# Patient Record
Sex: FEMALE | Race: BLACK OR AFRICAN AMERICAN | Employment: OTHER | URBAN - METROPOLITAN AREA
[De-identification: names, ages, dates, MRNs, and addresses within clinical notes are randomized per-mention and may not be internally consistent; named-entity substitution may affect disease eponyms.]

---

## 2017-01-25 ENCOUNTER — TELEPHONE (OUTPATIENT)
Dept: NEUROLOGY | Facility: CLINIC | Age: 57
End: 2017-01-25

## 2017-01-25 ENCOUNTER — TELEPHONE (OUTPATIENT)
Dept: FAMILY MEDICINE CLINIC | Facility: CLINIC | Age: 57
End: 2017-01-25

## 2017-01-25 DIAGNOSIS — M25.559 ARTHRALGIA OF HIP, UNSPECIFIED LATERALITY: Primary | ICD-10-CM

## 2017-01-25 NOTE — TELEPHONE ENCOUNTER
Pt is calling state that she had a referral to see Dr Lorrie Pascal and her referral had   Pt want to know if she can have a extension on her referral pt state that she have a appt for tomorrow   Pt is requesting a call back

## 2017-01-26 ENCOUNTER — OFFICE VISIT (OUTPATIENT)
Dept: NEUROLOGY | Facility: CLINIC | Age: 57
End: 2017-01-26

## 2017-01-26 ENCOUNTER — HOSPITAL ENCOUNTER (OUTPATIENT)
Dept: GENERAL RADIOLOGY | Facility: HOSPITAL | Age: 57
Discharge: HOME | End: 2017-01-26
Attending: PHYSICAL MEDICINE & REHABILITATION
Payer: COMMERCIAL

## 2017-01-26 ENCOUNTER — TELEPHONE (OUTPATIENT)
Dept: NEUROLOGY | Facility: CLINIC | Age: 57
End: 2017-01-26

## 2017-01-26 VITALS
DIASTOLIC BLOOD PRESSURE: 72 MMHG | SYSTOLIC BLOOD PRESSURE: 128 MMHG | HEIGHT: 66 IN | HEART RATE: 86 BPM | BODY MASS INDEX: 27.32 KG/M2 | WEIGHT: 170 LBS | RESPIRATION RATE: 16 BRPM | OXYGEN SATURATION: 98 %

## 2017-01-26 DIAGNOSIS — M51.9 LUMBAR DISC DISEASE: ICD-10-CM

## 2017-01-26 DIAGNOSIS — M54.16 LUMBAR RADICULOPATHY: ICD-10-CM

## 2017-01-26 DIAGNOSIS — M43.16 SPONDYLOLISTHESIS OF LUMBAR REGION: ICD-10-CM

## 2017-01-26 DIAGNOSIS — M54.42 CHRONIC MIDLINE LOW BACK PAIN WITH LEFT-SIDED SCIATICA: Primary | ICD-10-CM

## 2017-01-26 DIAGNOSIS — M25.552 LEFT HIP PAIN: ICD-10-CM

## 2017-01-26 DIAGNOSIS — G89.29 CHRONIC MIDLINE LOW BACK PAIN WITH LEFT-SIDED SCIATICA: ICD-10-CM

## 2017-01-26 DIAGNOSIS — M54.42 CHRONIC MIDLINE LOW BACK PAIN WITH LEFT-SIDED SCIATICA: ICD-10-CM

## 2017-01-26 DIAGNOSIS — G89.29 CHRONIC MIDLINE LOW BACK PAIN WITH LEFT-SIDED SCIATICA: Primary | ICD-10-CM

## 2017-01-26 PROCEDURE — 72120 X-RAY BEND ONLY L-S SPINE: CPT

## 2017-01-26 PROCEDURE — 99244 OFF/OP CNSLTJ NEW/EST MOD 40: CPT | Performed by: PHYSICAL MEDICINE & REHABILITATION

## 2017-01-26 RX ORDER — IBUPROFEN 600 MG/1
600 TABLET ORAL EVERY 8 HOURS PRN
Qty: 90 TABLET | Refills: 3 | Status: SHIPPED | OUTPATIENT
Start: 2017-01-26 | End: 2017-09-07

## 2017-01-26 NOTE — PROGRESS NOTES
Low Back Pain H & P    Chief Complaint:  Patient presents with:  Pain: NP referred by Dr Bin Jaramillo. Pt has long history of pain in left hip, now having pain in right hip also. Pain start in left hip goes to outer aspect of leg down to foot.  Pt present has don History   Past Medical History   Diagnosis Date   • Shingles 2014   • Ectopic pregnancy    • Lipid screening 8/1/2013     per NG       Past Surgical History   No past surgical history on file.     Family History   Family History   Problem Relation Age of On mood.      Respiratory:  No acute respiratory distress. Patient does not have a cough. HEENT:  Extraocular muscles are intact. There is no kern icterus. Pupils are equal, round, and reactive to light. No redness or discharge bilaterally.     Skin:  There pain   RIGHT hip MERISSA test Negative for pain   LEFT hip MERISSA test Negative for pain   RIGHT hip internal rotation Negative for pain   LEFT hip internal rotation Negative for pain   RIGHT hip piriformis stretch test Negative for pain   LEFT hip piriformis

## 2017-01-26 NOTE — PATIENT INSTRUCTIONS
Refill policies:    • Allow 2 business days for refills; controlled substances may take longer.   • Contact your pharmacy at least 5 days prior to running out of medication and have them send an electronic request or submit request through the “request re your physician has recommended that you have a procedure or additional testing performed. [facility] will contact your insurance carrier to obtain pre-certification or prior authorization.     Unfortunately, KATIE has seen an increas

## 2017-01-27 ENCOUNTER — MED REC SCAN ONLY (OUTPATIENT)
Dept: NEUROLOGY | Facility: CLINIC | Age: 57
End: 2017-01-27

## 2017-02-02 PROBLEM — I83.893 VARICOSE VEINS OF BOTH LOWER EXTREMITIES WITH COMPLICATIONS: Status: ACTIVE | Noted: 2017-02-02

## 2017-02-11 ENCOUNTER — TELEPHONE (OUTPATIENT)
Dept: INTERNAL MEDICINE CLINIC | Facility: CLINIC | Age: 57
End: 2017-02-11

## 2017-02-11 RX ORDER — TOBRAMYCIN 3 MG/ML
2 SOLUTION/ DROPS OPHTHALMIC EVERY 6 HOURS
Qty: 5 ML | Refills: 0 | Status: SHIPPED | OUTPATIENT
Start: 2017-02-11 | End: 2017-09-20

## 2017-02-11 NOTE — TELEPHONE ENCOUNTER
Patient, will treat with tobramycin ophthalmic drops, for 5 days, requested that she reports if she has not improved no improvement in symptoms, may need to see ophthalmologist

## 2017-02-11 NOTE — TELEPHONE ENCOUNTER
Reason for Call/Chief Complaint: Eye redness and itching  Onset: 2 days  Nursing Assessment/Associated Symptoms: Pt states that for 2 days she has had redness to her left eye, began itching yesterday and is now affecting the right eye.  Denies drainage, den

## 2017-02-23 ENCOUNTER — TELEPHONE (OUTPATIENT)
Dept: NEUROLOGY | Facility: CLINIC | Age: 57
End: 2017-02-23

## 2017-02-23 NOTE — TELEPHONE ENCOUNTER
----- Message from Mohinder Arana MD sent at 2/22/2017 10:36 PM CST -----  Her spine is stable at L3-4.   Please see how she is doing with the PT.

## 2017-02-23 NOTE — TELEPHONE ENCOUNTER
Patient informed of the below Lumbar Spine Flex/Ext Xrays results per Dr. Patrick Calhoun. Patient has not started Physical Therapy because she has to work it out with her employer also she just had 2 deaths in the family. Condolences offered to patient.   Patient sa

## 2017-03-01 RX ORDER — ERGOCALCIFEROL 1.25 MG/1
CAPSULE ORAL
Qty: 12 CAPSULE | Refills: 0 | Status: SHIPPED | OUTPATIENT
Start: 2017-03-01 | End: 2018-01-23

## 2017-03-07 ENCOUNTER — TELEPHONE (OUTPATIENT)
Dept: INTERNAL MEDICINE CLINIC | Facility: CLINIC | Age: 57
End: 2017-03-07

## 2017-03-07 ENCOUNTER — OFFICE VISIT (OUTPATIENT)
Dept: INTERNAL MEDICINE CLINIC | Facility: CLINIC | Age: 57
End: 2017-03-07

## 2017-03-07 VITALS
BODY MASS INDEX: 29 KG/M2 | SYSTOLIC BLOOD PRESSURE: 125 MMHG | RESPIRATION RATE: 22 BRPM | HEART RATE: 97 BPM | DIASTOLIC BLOOD PRESSURE: 73 MMHG | WEIGHT: 181 LBS | TEMPERATURE: 98 F

## 2017-03-07 DIAGNOSIS — J02.9 PHARYNGITIS, UNSPECIFIED ETIOLOGY: Primary | ICD-10-CM

## 2017-03-07 LAB
CONTROL LINE PRESENT WITH A CLEAR BACKGROUND (YES/NO): YES YES/NO
KIT LOT #: NORMAL NUMERIC
STREP GRP A CUL-SCR: NEGATIVE

## 2017-03-07 PROCEDURE — 87880 STREP A ASSAY W/OPTIC: CPT | Performed by: INTERNAL MEDICINE

## 2017-03-07 PROCEDURE — 99213 OFFICE O/P EST LOW 20 MIN: CPT | Performed by: INTERNAL MEDICINE

## 2017-03-07 RX ORDER — AZITHROMYCIN 250 MG/1
TABLET, FILM COATED ORAL
Qty: 6 TABLET | Refills: 0 | Status: SHIPPED | OUTPATIENT
Start: 2017-03-07 | End: 2017-09-20

## 2017-03-08 NOTE — TELEPHONE ENCOUNTER
Physical therapy of requested by Dr. Asher Ramos in January was approved, but I do not see how many visits was approved, and to make appointment, please send a new referral if needed.

## 2017-03-08 NOTE — TELEPHONE ENCOUNTER
Dr Peg Pink requested referral. Approved for 8 visits at [address] [place] [address].  Patient would need to contact Dr Peg Pink office for a copy of referral.

## 2017-03-08 NOTE — PROGRESS NOTES
HPI:    Patient ID: Antione Wiley is a 64year old female presents for evaluation of the sore throat. HPI  Patient reports very sore throat for last 3 days, she lost voice and now it is coming back.   Sore throat extremely severe night, keeps her awake, and time. She appears well-developed and well-nourished. No distress. HENT:   Head: Normocephalic and atraumatic. Right Ear: Tympanic membrane is not erythematous. No cerumen present  Left Ear: Tympanic membrane is not erythematous.  No cerumen present

## 2017-05-18 ENCOUNTER — TELEPHONE (OUTPATIENT)
Dept: INTERNAL MEDICINE CLINIC | Facility: CLINIC | Age: 57
End: 2017-05-18

## 2017-05-25 ENCOUNTER — APPOINTMENT (OUTPATIENT)
Dept: LAB | Age: 57
End: 2017-05-25
Attending: INTERNAL MEDICINE
Payer: COMMERCIAL

## 2017-05-25 DIAGNOSIS — Z12.11 COLON CANCER SCREENING: ICD-10-CM

## 2017-05-30 ENCOUNTER — TELEPHONE (OUTPATIENT)
Dept: INTERNAL MEDICINE CLINIC | Facility: CLINIC | Age: 57
End: 2017-05-30

## 2017-05-30 DIAGNOSIS — E55.9 VITAMIN D DEFICIENCY: Primary | ICD-10-CM

## 2017-05-30 RX ORDER — ERGOCALCIFEROL 1.25 MG/1
CAPSULE ORAL
Qty: 12 CAPSULE | Refills: 0 | OUTPATIENT
Start: 2017-05-30

## 2017-05-30 NOTE — TELEPHONE ENCOUNTER
Pt  Should   Do lab Vit  D level , BMP  BEFORE   PRESCRIPT[ION VIT d  WILL BE PRESCRIBED  AGAIN , SHE SHOULD CONTINUE   OTC  VIT D3  2000 UNITS DAILY  ON MEANWHILE INDEFINITELY,i placed order for labs

## 2017-05-31 PROCEDURE — 82270 OCCULT BLOOD FECES: CPT

## 2017-06-05 ENCOUNTER — TELEPHONE (OUTPATIENT)
Dept: INTERNAL MEDICINE CLINIC | Facility: CLINIC | Age: 57
End: 2017-06-05

## 2017-06-05 NOTE — TELEPHONE ENCOUNTER
Patient reports pains in the breast that come and go since ultrasound 6 months ago I advised her to watch and document if physical overexertion has something to do with the pain she is having, over wise as scheduled, and follow-up for reexamination of the

## 2017-09-07 RX ORDER — IBUPROFEN 600 MG/1
TABLET ORAL
Qty: 90 TABLET | Refills: 0 | Status: SHIPPED | OUTPATIENT
Start: 2017-09-07 | End: 2018-07-25

## 2017-09-07 NOTE — TELEPHONE ENCOUNTER
Refill request for ibuprofen 600 mg, take 1 tab every 8 hrs as needed, #90, no refills    LOV: 1/26/17  NOV: none

## 2017-09-20 ENCOUNTER — OFFICE VISIT (OUTPATIENT)
Dept: INTERNAL MEDICINE CLINIC | Facility: CLINIC | Age: 57
End: 2017-09-20

## 2017-09-20 ENCOUNTER — APPOINTMENT (OUTPATIENT)
Dept: LAB | Facility: HOSPITAL | Age: 57
End: 2017-09-20
Attending: INTERNAL MEDICINE
Payer: COMMERCIAL

## 2017-09-20 ENCOUNTER — TELEPHONE (OUTPATIENT)
Dept: OTHER | Age: 57
End: 2017-09-20

## 2017-09-20 VITALS
SYSTOLIC BLOOD PRESSURE: 117 MMHG | HEART RATE: 84 BPM | WEIGHT: 188 LBS | BODY MASS INDEX: 30 KG/M2 | DIASTOLIC BLOOD PRESSURE: 73 MMHG

## 2017-09-20 DIAGNOSIS — M54.16 LUMBAR RADICULOPATHY: Primary | ICD-10-CM

## 2017-09-20 DIAGNOSIS — E55.9 VITAMIN D DEFICIENCY: ICD-10-CM

## 2017-09-20 DIAGNOSIS — M25.562 CHRONIC PAIN OF LEFT KNEE: ICD-10-CM

## 2017-09-20 DIAGNOSIS — G89.29 CHRONIC PAIN OF LEFT KNEE: ICD-10-CM

## 2017-09-20 LAB
ALBUMIN SERPL BCP-MCNC: 4 G/DL (ref 3.5–4.8)
ALBUMIN/GLOB SERPL: 1.3 {RATIO} (ref 1–2)
ALP SERPL-CCNC: 88 U/L (ref 32–100)
ALT SERPL-CCNC: 19 U/L (ref 14–54)
ANION GAP SERPL CALC-SCNC: 6 MMOL/L (ref 0–18)
AST SERPL-CCNC: 27 U/L (ref 15–41)
BILIRUB SERPL-MCNC: 0.8 MG/DL (ref 0.3–1.2)
BUN SERPL-MCNC: 8 MG/DL (ref 8–20)
BUN/CREAT SERPL: 11.9 (ref 10–20)
CALCIUM SERPL-MCNC: 9.6 MG/DL (ref 8.5–10.5)
CHLORIDE SERPL-SCNC: 107 MMOL/L (ref 95–110)
CO2 SERPL-SCNC: 27 MMOL/L (ref 22–32)
CREAT SERPL-MCNC: 0.67 MG/DL (ref 0.5–1.5)
GLOBULIN PLAS-MCNC: 3.1 G/DL (ref 2.5–3.7)
GLUCOSE SERPL-MCNC: 86 MG/DL (ref 70–99)
OSMOLALITY UR CALC.SUM OF ELEC: 288 MOSM/KG (ref 275–295)
POTASSIUM SERPL-SCNC: 3.9 MMOL/L (ref 3.3–5.1)
PROT SERPL-MCNC: 7.1 G/DL (ref 5.9–8.4)
SODIUM SERPL-SCNC: 140 MMOL/L (ref 136–144)

## 2017-09-20 PROCEDURE — 82306 VITAMIN D 25 HYDROXY: CPT

## 2017-09-20 PROCEDURE — 36415 COLL VENOUS BLD VENIPUNCTURE: CPT

## 2017-09-20 PROCEDURE — 99212 OFFICE O/P EST SF 10 MIN: CPT | Performed by: INTERNAL MEDICINE

## 2017-09-20 PROCEDURE — 99214 OFFICE O/P EST MOD 30 MIN: CPT | Performed by: INTERNAL MEDICINE

## 2017-09-20 PROCEDURE — 80053 COMPREHEN METABOLIC PANEL: CPT

## 2017-09-20 RX ORDER — CYCLOBENZAPRINE HCL 10 MG
10 TABLET ORAL NIGHTLY
Qty: 30 TABLET | Refills: 0 | Status: SHIPPED | OUTPATIENT
Start: 2017-09-20 | End: 2018-01-23

## 2017-09-20 RX ORDER — ALPRAZOLAM 0.5 MG/1
TABLET, ORALLY DISINTEGRATING ORAL
Qty: 5 TABLET | Refills: 0 | Status: SHIPPED | OUTPATIENT
Start: 2017-09-20 | End: 2017-12-06

## 2017-09-20 RX ORDER — ALPRAZOLAM 0.5 MG/1
TABLET ORAL
Qty: 5 TABLET | Refills: 0 | Status: SHIPPED | OUTPATIENT
Start: 2017-09-20 | End: 2018-01-23

## 2017-09-20 NOTE — TELEPHONE ENCOUNTER
Asking if can get labs ordered for Vitamin D to have drawn before appt as does not want to fast until her appt this afternoon. [facility] had asked Dr Avila Kirk for vitamin D lab order a while ago. Informed pt there is a Vit D lab order from May that [facility] had ordered.

## 2017-09-22 LAB — 25(OH)D3 SERPL-MCNC: 27.9 NG/ML

## 2017-09-22 NOTE — PROGRESS NOTES
HPI:    Patient ID: Herrera Saunders is a 62year old female. Presents for evaluation of the low back pain, left leg pain.   Patient    HPI  Patient reports that she is bothered again by severe pain in the left thigh going down to the left knee, especially c WEEK Disp: 12 capsule Rfl: 0     Allergies:No Known Allergies   /73 (BP Location: Right arm, Patient Position: Sitting, Cuff Size: adult)   Pulse 84   Wt 188 lb (85.3 kg)   BMI 30.34 kg/m²    Physical Exam    Constitutional: She is oriented to person Referrals:  MRI SPINE LUMBAR (CPT=72148)  XR KNEE ROUTINE (3 VIEWS), LEFT (CPT=73562)         VD#[id number]

## 2017-09-25 ENCOUNTER — TELEPHONE (OUTPATIENT)
Dept: OTHER | Age: 57
End: 2017-09-25

## 2017-09-25 NOTE — TELEPHONE ENCOUNTER
Per patient MARAL okay to leave detailed message on VM including blood test results. Detailed VM left on verified VM (Patient stated her name). To call back for any questions or concerns.      ----- Message from Nasima Ackerman MD sent at 9/22/2017  9:58 PM CDT

## 2017-10-11 ENCOUNTER — HOSPITAL ENCOUNTER (OUTPATIENT)
Dept: MRI IMAGING | Facility: HOSPITAL | Age: 57
Discharge: HOME | End: 2017-10-11
Attending: INTERNAL MEDICINE
Payer: COMMERCIAL

## 2017-10-11 DIAGNOSIS — M54.16 LUMBAR RADICULOPATHY: ICD-10-CM

## 2017-10-11 PROCEDURE — 72148 MRI LUMBAR SPINE W/O DYE: CPT | Performed by: INTERNAL MEDICINE

## 2017-10-16 ENCOUNTER — TELEPHONE (OUTPATIENT)
Dept: INTERNAL MEDICINE CLINIC | Facility: CLINIC | Age: 57
End: 2017-10-16

## 2017-10-16 DIAGNOSIS — M79.605 PAIN OF LEFT LOWER EXTREMITY: ICD-10-CM

## 2017-10-16 DIAGNOSIS — M54.16 RADICULOPATHY OF LUMBAR REGION: Primary | ICD-10-CM

## 2017-10-16 DIAGNOSIS — K76.89 LIVER CYST: ICD-10-CM

## 2017-10-26 ENCOUNTER — HOSPITAL ENCOUNTER (OUTPATIENT)
Dept: ULTRASOUND IMAGING | Facility: HOSPITAL | Age: 57
Discharge: HOME | End: 2017-10-26
Attending: INTERNAL MEDICINE
Payer: COMMERCIAL

## 2017-10-26 DIAGNOSIS — M79.605 PAIN OF LEFT LOWER EXTREMITY: ICD-10-CM

## 2017-10-26 DIAGNOSIS — K76.89 LIVER CYST: ICD-10-CM

## 2017-10-26 PROCEDURE — 93971 EXTREMITY STUDY: CPT | Performed by: INTERNAL MEDICINE

## 2017-10-26 PROCEDURE — 76700 US EXAM ABDOM COMPLETE: CPT | Performed by: INTERNAL MEDICINE

## 2017-10-30 ENCOUNTER — TELEPHONE (OUTPATIENT)
Dept: INTERNAL MEDICINE CLINIC | Facility: CLINIC | Age: 57
End: 2017-10-30

## 2017-10-30 DIAGNOSIS — L30.9 DERMATITIS: Primary | ICD-10-CM

## 2017-10-30 DIAGNOSIS — L81.9 HYPERPIGMENTATION: ICD-10-CM

## 2017-11-07 ENCOUNTER — NURSE TRIAGE (OUTPATIENT)
Dept: OTHER | Age: 57
End: 2017-11-07

## 2017-11-07 ENCOUNTER — TELEPHONE (OUTPATIENT)
Dept: INTERNAL MEDICINE CLINIC | Facility: CLINIC | Age: 57
End: 2017-11-07

## 2017-11-07 ENCOUNTER — OFFICE VISIT (OUTPATIENT)
Dept: OPHTHALMOLOGY | Facility: CLINIC | Age: 57
End: 2017-11-07

## 2017-11-07 DIAGNOSIS — R92.30 DENSE BREAST TISSUE ON MAMMOGRAM: Primary | ICD-10-CM

## 2017-11-07 DIAGNOSIS — S05.91XS RIGHT EYE INJURY, SEQUELA: Primary | ICD-10-CM

## 2017-11-07 DIAGNOSIS — H20.00 ACUTE IRITIS, RIGHT EYE: Primary | ICD-10-CM

## 2017-11-07 PROBLEM — S05.01XA RIGHT CORNEAL ABRASION: Status: ACTIVE | Noted: 2017-11-07

## 2017-11-07 PROCEDURE — 99242 OFF/OP CONSLTJ NEW/EST SF 20: CPT | Performed by: OPHTHALMOLOGY

## 2017-11-07 PROCEDURE — 99212 OFFICE O/P EST SF 10 MIN: CPT | Performed by: OPHTHALMOLOGY

## 2017-11-07 RX ORDER — KETOROLAC TROMETHAMINE 5 MG/ML
1 SOLUTION OPHTHALMIC 4 TIMES DAILY
Qty: 1 BOTTLE | Refills: 0 | Status: SHIPPED | OUTPATIENT
Start: 2017-11-07 | End: 2017-11-14
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
## 2017-11-07 NOTE — TELEPHONE ENCOUNTER
appt made with Dr. Jesi Mariano for 10:15. Pt agrees and is able to come at that time. Referral generated.

## 2017-11-07 NOTE — TELEPHONE ENCOUNTER
Pt sent [facility] [employer] request for a mammogram order to be placed on her chart. Please contact Pt once order has been confirmed.

## 2017-11-07 NOTE — TELEPHONE ENCOUNTER
Spoke to pt  adivsied that we will try to get her to see ophtalmologist for evaluation  And will call her back, please  Facilitate  appt  For pt today

## 2017-11-07 NOTE — PATIENT INSTRUCTIONS
Acute iritis, right eye  Discussed diagnosis and treatment in detail with patient. Reassured that there is no foreign body or scratch at this time. Start Acular drops 4 times a day in the right eye for 1 week.    Patient should call office and make retu

## 2017-11-07 NOTE — ASSESSMENT & PLAN NOTE
Discussed diagnosis and treatment in detail with patient. Reassured that there is no foreign body or scratch at this time. Start Acular drops 4 times a day in the right eye for 1 week.    Patient should call office and make return appointment if symptom
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
## 2017-11-15 ENCOUNTER — HOSPITAL ENCOUNTER (OUTPATIENT)
Dept: MRI IMAGING | Facility: HOSPITAL | Age: 57
Discharge: HOME | End: 2017-11-15
Attending: INTERNAL MEDICINE
Payer: COMMERCIAL

## 2017-11-15 DIAGNOSIS — K76.9 LIVER LESION: ICD-10-CM

## 2017-11-15 PROCEDURE — 82565 ASSAY OF CREATININE: CPT

## 2017-11-15 PROCEDURE — A9575 INJ GADOTERATE MEGLUMI 0.1ML: HCPCS

## 2017-11-15 PROCEDURE — 74183 MRI ABD W/O CNTR FLWD CNTR: CPT | Performed by: INTERNAL MEDICINE

## 2017-11-28 ENCOUNTER — HOSPITAL ENCOUNTER (OUTPATIENT)
Dept: MAMMOGRAPHY | Facility: HOSPITAL | Age: 57
Discharge: HOME | End: 2017-11-28
Attending: INTERNAL MEDICINE
Payer: COMMERCIAL

## 2017-11-28 DIAGNOSIS — R92.2 DENSE BREAST TISSUE ON MAMMOGRAM: ICD-10-CM

## 2017-11-28 PROCEDURE — 77066 DX MAMMO INCL CAD BI: CPT | Performed by: INTERNAL MEDICINE

## 2017-12-06 ENCOUNTER — OFFICE VISIT (OUTPATIENT)
Dept: DERMATOLOGY CLINIC | Facility: CLINIC | Age: 57
End: 2017-12-06

## 2017-12-06 DIAGNOSIS — L81.9 DYSCHROMIA: ICD-10-CM

## 2017-12-06 DIAGNOSIS — L70.0 ACNE VULGARIS: ICD-10-CM

## 2017-12-06 DIAGNOSIS — L30.9 DERMATITIS: Primary | ICD-10-CM

## 2017-12-06 PROCEDURE — 99213 OFFICE O/P EST LOW 20 MIN: CPT | Performed by: DERMATOLOGY

## 2017-12-06 PROCEDURE — 99212 OFFICE O/P EST SF 10 MIN: CPT | Performed by: DERMATOLOGY

## 2017-12-06 RX ORDER — TRETINOIN 0.025 %
CREAM (GRAM) TOPICAL
Qty: 20 G | Refills: 2 | Status: SHIPPED | OUTPATIENT
Start: 2017-12-06 | End: 2018-01-23

## 2017-12-06 RX ORDER — CLINDAMYCIN PHOSPHATE 10 MG/ML
LOTION TOPICAL
Qty: 60 ML | Refills: 2 | Status: SHIPPED | OUTPATIENT
Start: 2017-12-06 | End: 2018-02-20 | Stop reason: ALTCHOICE

## 2017-12-07 ENCOUNTER — TELEPHONE (OUTPATIENT)
Dept: DERMATOLOGY CLINIC | Facility: CLINIC | Age: 57
End: 2017-12-07

## 2017-12-14 NOTE — TELEPHONE ENCOUNTER
[facility] contacted, notified that PA tor tretinoin was approved. Pharmacy states that med went through for $10.00, pharmacy will contact ptRosalie

## 2017-12-14 NOTE — TELEPHONE ENCOUNTER
Scanned Insurance Card notes that [facility] Adelina is pharmacy benefit manager. Adelina contacted completed over the telephone. PA approved for 36 months. 12/14/2020. PA # [id number].

## 2017-12-18 NOTE — PROGRESS NOTES
Tyree Meyer is a 62year old female. Patient presents with:  Acne: LOV 11/16/2016. Pt presenting with acne to bilateral cheeks. c/o hyperpigmentation. Dryness: Pt presenting with dryness to forehard and temples. c/o flakes.              Patient ha tablet (500 mg total) by mouth daily. Disp: 90 tablet Rfl: 1     Allergies:   No Known Allergies    Past Medical History:   Diagnosis Date   • Ectopic pregnancy    • Lipid screening 8/1/2013    per NG   • Shingles 2014     History reviewed.  No pertinent nash including scalp, head, neck, face,nails, hair, external eyes, including conjunctival mucosa, eyelids, oral mucosa, external ears, back, chest, abdomen, bilateral arms, bilateral legs, palms.         Remarkable for multiple erythematous scaling eczematous pa tolerated. Need for chronic use over several months to see maximum benefit. Risk of dryness, redness ,peeling irritation tenderness sun sensitivity discussed. Topical vitamin A's s should not be used in pregnancy.     Post inflammatory hyperpigmentation

## 2017-12-20 ENCOUNTER — OFFICE VISIT (OUTPATIENT)
Dept: OBGYN CLINIC | Facility: CLINIC | Age: 57
End: 2017-12-20

## 2017-12-20 VITALS — DIASTOLIC BLOOD PRESSURE: 70 MMHG | WEIGHT: 188 LBS | BODY MASS INDEX: 30 KG/M2 | SYSTOLIC BLOOD PRESSURE: 118 MMHG

## 2017-12-20 DIAGNOSIS — Z01.419 WELL WOMAN EXAM WITH ROUTINE GYNECOLOGICAL EXAM: Primary | ICD-10-CM

## 2017-12-20 DIAGNOSIS — F41.9 ANXIETY: ICD-10-CM

## 2017-12-20 DIAGNOSIS — Z56.6 STRESS AT WORK: ICD-10-CM

## 2017-12-20 DIAGNOSIS — I83.90 VARICOSITIES OF LEG: ICD-10-CM

## 2017-12-20 PROCEDURE — 99396 PREV VISIT EST AGE 40-64: CPT | Performed by: ADVANCED PRACTICE MIDWIFE

## 2017-12-20 SDOH — HEALTH STABILITY - MENTAL HEALTH: OTHER PHYSICAL AND MENTAL STRAIN RELATED TO WORK: Z56.6

## 2017-12-20 NOTE — PROGRESS NOTES
HPI:    Patient ID: Srinivasa Story is a 62year old female. Pain in her left leg has varicosities behind her knees    Is feeling overwhelmed by her job and is in increased stress environment.   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 No murmur heard. Pulmonary/Chest: Effort normal and breath sounds normal. No respiratory distress. She exhibits no tenderness. Right breast exhibits no inverted nipple, no mass, no nipple discharge, no skin change and no tenderness.  Left reassess veins since pain in legs is increasing over last year. Needs referral from primary  7. Referral for therapy due to increasing job stress causing panic sx  8. RTC 1 year or prn    No orders of the defined types were placed in this encounter.

## 2018-01-17 ENCOUNTER — TELEPHONE (OUTPATIENT)
Dept: ADMINISTRATIVE | Age: 58
End: 2018-01-17

## 2018-01-17 NOTE — TELEPHONE ENCOUNTER
Spoke w/ pt, will stop @ [place] to drop off FMLA form for Dr. Luis Ames and sign FCR[employer], and pay.  NK

## 2018-01-17 NOTE — TELEPHONE ENCOUNTER
Spoke to pt and she has an appt coming up on 1/23/18 and she will discuss the FMLA at the time.     Thank you,  [facility]

## 2018-01-17 NOTE — TELEPHONE ENCOUNTER
Dr. Niurka Morris pending in [place]. Pt is requesting 1-4 days per month with episodes lasting 1-48 hrs for 12 months starting 1/17/18 due to back and leg pain. Do you approve? Please advise.     Thank you,  [facility]

## 2018-01-23 ENCOUNTER — TELEPHONE (OUTPATIENT)
Dept: INTERNAL MEDICINE CLINIC | Facility: CLINIC | Age: 58
End: 2018-01-23

## 2018-01-23 ENCOUNTER — OFFICE VISIT (OUTPATIENT)
Dept: INTERNAL MEDICINE CLINIC | Facility: CLINIC | Age: 58
End: 2018-01-23

## 2018-01-23 VITALS
BODY MASS INDEX: 31.03 KG/M2 | DIASTOLIC BLOOD PRESSURE: 76 MMHG | HEART RATE: 99 BPM | TEMPERATURE: 97 F | SYSTOLIC BLOOD PRESSURE: 125 MMHG | HEIGHT: 64.5 IN | WEIGHT: 184 LBS | RESPIRATION RATE: 24 BRPM

## 2018-01-23 DIAGNOSIS — Z00.00 PHYSICAL EXAM, ANNUAL: Primary | ICD-10-CM

## 2018-01-23 DIAGNOSIS — M54.17 RADICULOPATHY OF LUMBOSACRAL REGION: ICD-10-CM

## 2018-01-23 DIAGNOSIS — M51.9 LUMBAR DISC DISEASE: ICD-10-CM

## 2018-01-23 DIAGNOSIS — H61.21 HEARING LOSS DUE TO CERUMEN IMPACTION, RIGHT: ICD-10-CM

## 2018-01-23 DIAGNOSIS — M54.16 LUMBAR RADICULOPATHY: ICD-10-CM

## 2018-01-23 DIAGNOSIS — E55.9 VITAMIN D DEFICIENCY: ICD-10-CM

## 2018-01-23 DIAGNOSIS — I87.2 VENOUS INSUFFICIENCY OF BOTH LOWER EXTREMITIES: ICD-10-CM

## 2018-01-23 DIAGNOSIS — I83.90 VARICOSITIES OF LEG: ICD-10-CM

## 2018-01-23 DIAGNOSIS — I87.2 VENOUS INSUFFICIENCY OF LOWER EXTREMITY, UNSPECIFIED LATERALITY: ICD-10-CM

## 2018-01-23 PROCEDURE — 99214 OFFICE O/P EST MOD 30 MIN: CPT | Performed by: INTERNAL MEDICINE

## 2018-01-23 PROCEDURE — 99212 OFFICE O/P EST SF 10 MIN: CPT | Performed by: INTERNAL MEDICINE

## 2018-01-23 RX ORDER — CYCLOBENZAPRINE HCL 10 MG
10 TABLET ORAL NIGHTLY
Qty: 90 TABLET | Refills: 0 | Status: SHIPPED | OUTPATIENT
Start: 2018-01-23

## 2018-01-24 ENCOUNTER — TELEPHONE (OUTPATIENT)
Dept: INTERNAL MEDICINE CLINIC | Facility: CLINIC | Age: 58
End: 2018-01-24

## 2018-01-24 NOTE — TELEPHONE ENCOUNTER
Dr. Manuela Obregon pending in [place]. Pt is requesting 1-4 days per month with episodes lasting 1-48 hrs for 12 months starting 1/17/18 due to back and leg pain. Do you approve?  Please advise.     Thank you,  [facility]

## 2018-01-24 NOTE — TELEPHONE ENCOUNTER
Spoke with dr and approves 1-6 days per month (1-48 hr ep). Pt to  orig. Forms at [facility] office.

## 2018-01-24 NOTE — TELEPHONE ENCOUNTER
Pt has a referral to see Dr Antonio Hernandez. Spoke to patient and checked with [facility], Dr is not in network. Pt would like a second opinion, saw Dr Yas Bee previously. We typically recommend Dr Iliana Houser from [place].    Is this an appropriate recommendation for this pat

## 2018-01-24 NOTE — PROGRESS NOTES
HPI:    Patient ID: Bard Sykes is a 62year old female. Presents for physical exam.    HPI  Main concern patient has a disc low-back pain radiating to the both legs, pain is shooting in nature usually worse by the end of the day.   She works in intermediate as psychiatric symptoms     Current Outpatient Prescriptions:  Cyclobenzaprine HCl 10 MG Oral Tab Take 1 tablet (10 mg total) by mouth nightly.  Disp: 90 tablet Rfl: 0   Clindamycin Phosphate 1 % External Lotion Use bid to acne Disp: 60 mL Rfl: 2   IBUPROFEN 6 radiculopathies  Varicosities of leg see vascular specialist again venous insufficiency of both lower extremities  Follow-up in 2 months  No orders of the defined types were placed in this encounter.       Meds This Visit:  Signed Prescriptions Disp Refills

## 2018-02-05 RX ORDER — IBUPROFEN 600 MG/1
TABLET ORAL
Qty: 90 TABLET | Refills: 0 | OUTPATIENT
Start: 2018-02-05

## 2018-02-06 ENCOUNTER — LAB ENCOUNTER (OUTPATIENT)
Dept: LAB | Facility: HOSPITAL | Age: 58
End: 2018-02-06
Attending: SURGERY
Payer: COMMERCIAL

## 2018-02-06 DIAGNOSIS — E55.9 VITAMIN D DEFICIENCY: ICD-10-CM

## 2018-02-06 DIAGNOSIS — Z00.00 PHYSICAL EXAM, ANNUAL: ICD-10-CM

## 2018-02-06 LAB
ALBUMIN SERPL BCP-MCNC: 4.1 G/DL (ref 3.5–4.8)
ALBUMIN/GLOB SERPL: 1.1 {RATIO} (ref 1–2)
ALP SERPL-CCNC: 76 U/L (ref 32–100)
ALT SERPL-CCNC: 21 U/L (ref 14–54)
ANION GAP SERPL CALC-SCNC: 8 MMOL/L (ref 0–18)
AST SERPL-CCNC: 23 U/L (ref 15–41)
BASOPHILS # BLD: 0 K/UL (ref 0–0.2)
BASOPHILS NFR BLD: 1 %
BILIRUB SERPL-MCNC: 1 MG/DL (ref 0.3–1.2)
BUN SERPL-MCNC: 5 MG/DL (ref 8–20)
BUN/CREAT SERPL: 6.3 (ref 10–20)
CALCIUM SERPL-MCNC: 9.7 MG/DL (ref 8.5–10.5)
CHLORIDE SERPL-SCNC: 104 MMOL/L (ref 95–110)
CHOLEST SERPL-MCNC: 208 MG/DL (ref 110–200)
CO2 SERPL-SCNC: 29 MMOL/L (ref 22–32)
CREAT SERPL-MCNC: 0.79 MG/DL (ref 0.5–1.5)
EOSINOPHIL # BLD: 0.1 K/UL (ref 0–0.7)
EOSINOPHIL NFR BLD: 2 %
ERYTHROCYTE [DISTWIDTH] IN BLOOD BY AUTOMATED COUNT: 15.1 % (ref 11–15)
GLOBULIN PLAS-MCNC: 3.6 G/DL (ref 2.5–3.7)
GLUCOSE SERPL-MCNC: 92 MG/DL (ref 70–99)
HCT VFR BLD AUTO: 41.3 % (ref 35–48)
HDLC SERPL-MCNC: 47 MG/DL
HGB BLD-MCNC: 13.2 G/DL (ref 12–16)
LDLC SERPL CALC-MCNC: 146 MG/DL (ref 0–99)
LYMPHOCYTES # BLD: 2.6 K/UL (ref 1–4)
LYMPHOCYTES NFR BLD: 46 %
MCH RBC QN AUTO: 27 PG (ref 27–32)
MCHC RBC AUTO-ENTMCNC: 31.9 G/DL (ref 32–37)
MCV RBC AUTO: 84.6 FL (ref 80–100)
MONOCYTES # BLD: 0.4 K/UL (ref 0–1)
MONOCYTES NFR BLD: 7 %
NEUTROPHILS # BLD AUTO: 2.6 K/UL (ref 1.8–7.7)
NEUTROPHILS NFR BLD: 45 %
NONHDLC SERPL-MCNC: 161 MG/DL
OSMOLALITY UR CALC.SUM OF ELEC: 289 MOSM/KG (ref 275–295)
PATIENT FASTING: YES
PLATELET # BLD AUTO: 245 K/UL (ref 140–400)
PMV BLD AUTO: 7.6 FL (ref 7.4–10.3)
POTASSIUM SERPL-SCNC: 4.1 MMOL/L (ref 3.3–5.1)
PROT SERPL-MCNC: 7.7 G/DL (ref 5.9–8.4)
RBC # BLD AUTO: 4.88 M/UL (ref 3.7–5.4)
SODIUM SERPL-SCNC: 141 MMOL/L (ref 136–144)
TRIGL SERPL-MCNC: 73 MG/DL (ref 1–149)
TSH SERPL-ACNC: 0.49 UIU/ML (ref 0.45–5.33)
WBC # BLD AUTO: 5.7 K/UL (ref 4–11)

## 2018-02-06 PROCEDURE — 36415 COLL VENOUS BLD VENIPUNCTURE: CPT

## 2018-02-06 PROCEDURE — 82306 VITAMIN D 25 HYDROXY: CPT

## 2018-02-06 PROCEDURE — 80053 COMPREHEN METABOLIC PANEL: CPT

## 2018-02-06 PROCEDURE — 84443 ASSAY THYROID STIM HORMONE: CPT

## 2018-02-06 PROCEDURE — 85025 COMPLETE CBC W/AUTO DIFF WBC: CPT

## 2018-02-06 PROCEDURE — 80061 LIPID PANEL: CPT

## 2018-02-07 ENCOUNTER — OFFICE VISIT (OUTPATIENT)
Dept: OTOLARYNGOLOGY | Facility: CLINIC | Age: 58
End: 2018-02-07

## 2018-02-07 VITALS
BODY MASS INDEX: 30.22 KG/M2 | DIASTOLIC BLOOD PRESSURE: 70 MMHG | TEMPERATURE: 98 F | SYSTOLIC BLOOD PRESSURE: 112 MMHG | WEIGHT: 188 LBS | HEIGHT: 66 IN

## 2018-02-07 DIAGNOSIS — H61.21 IMPACTED CERUMEN OF RIGHT EAR: Primary | ICD-10-CM

## 2018-02-07 LAB — 25(OH)D3 SERPL-MCNC: 34.6 NG/ML

## 2018-02-07 PROCEDURE — 99212 OFFICE O/P EST SF 10 MIN: CPT | Performed by: OTOLARYNGOLOGY

## 2018-02-07 PROCEDURE — 99242 OFF/OP CONSLTJ NEW/EST SF 20: CPT | Performed by: OTOLARYNGOLOGY

## 2018-02-07 PROCEDURE — 69210 REMOVE IMPACTED EAR WAX UNI: CPT | Performed by: OTOLARYNGOLOGY

## 2018-02-07 NOTE — PROGRESS NOTES
Aelxandru Soto is a 62year old female. Patient presents with:  Ear Wax: right ear per Dr Forest Morrell  2/7/2018   Here for evaluation of right sided  hearing loss.  Patient feels this has worsened over the las week and  is not Hema/Lymph Negative Easy bleeding and easy bruising.      PHYSICAL EXAM    /70 (BP Location: Right arm, Patient Position: Sitting, Cuff Size: large)   Temp 98.2 °F (36.8 °C) (Tympanic)   Ht 5' 6\" (1.676 m)   Wt 188 lb (85.3 kg)   BMI 30.34 kg/m² to baseline after wax was removed  - REMOVAL IMPACTED CERUMEN REQUIRING INSTRUMENTATION, UNILATERAL      Angeles Fortune MD    2/7/2018    12:24 PM

## 2018-02-13 ENCOUNTER — TELEPHONE (OUTPATIENT)
Dept: INTERNAL MEDICINE CLINIC | Facility: CLINIC | Age: 58
End: 2018-02-13

## 2018-02-13 DIAGNOSIS — Z12.11 COLON CANCER SCREENING: Primary | ICD-10-CM

## 2018-02-13 NOTE — TELEPHONE ENCOUNTER
Yumiko[facility] called stating that the order for the occult blood that was sent was actually a different order than what she needs to run the sample provided by the Pt. Yumiko states that is should read: OCCULT BLOOD SCRN OP CARDS.  Mekhi Guadalupe is requesting that t

## 2018-02-13 NOTE — TELEPHONE ENCOUNTER
Per Lizz Sidhu from [facility] pt dropped off OCC ULT blood screen Cards Times 3 and needs the Order due to Order on file is .

## 2018-02-13 NOTE — TELEPHONE ENCOUNTER
Spoke with pt. Pt states she was instructed by Nk to have stool test, she is not going for colonoscopy. Denies, abdominal pain , no diarrhea.

## 2018-02-14 NOTE — TELEPHONE ENCOUNTER
Spoke with layla. Informed that nk is ordered the FIT test, stool sample for occult blood scrn OP cards can be discard. Pt. was contacted about the fit order. Pt. Verbalized understanding.

## 2018-02-15 ENCOUNTER — APPOINTMENT (OUTPATIENT)
Dept: LAB | Facility: HOSPITAL | Age: 58
End: 2018-02-15
Attending: INTERNAL MEDICINE
Payer: COMMERCIAL

## 2018-02-15 DIAGNOSIS — Z12.11 COLON CANCER SCREENING: ICD-10-CM

## 2018-02-15 LAB — HEMOCCULT STL QL: NEGATIVE

## 2018-02-15 PROCEDURE — 82274 ASSAY TEST FOR BLOOD FECAL: CPT

## 2018-02-20 ENCOUNTER — MED REC SCAN ONLY (OUTPATIENT)
Dept: NEUROLOGY | Facility: CLINIC | Age: 58
End: 2018-02-20

## 2018-02-20 ENCOUNTER — OFFICE VISIT (OUTPATIENT)
Dept: NEUROLOGY | Facility: CLINIC | Age: 58
End: 2018-02-20

## 2018-02-20 VITALS
DIASTOLIC BLOOD PRESSURE: 74 MMHG | WEIGHT: 188 LBS | BODY MASS INDEX: 31.32 KG/M2 | SYSTOLIC BLOOD PRESSURE: 118 MMHG | HEIGHT: 65 IN | HEART RATE: 84 BPM | RESPIRATION RATE: 16 BRPM

## 2018-02-20 DIAGNOSIS — M43.16 SPONDYLOLISTHESIS AT L3-L4 LEVEL: Primary | ICD-10-CM

## 2018-02-20 DIAGNOSIS — M54.42 CHRONIC MIDLINE LOW BACK PAIN WITH LEFT-SIDED SCIATICA: ICD-10-CM

## 2018-02-20 DIAGNOSIS — M51.9 LUMBAR DISC DISEASE: ICD-10-CM

## 2018-02-20 DIAGNOSIS — G89.29 CHRONIC MIDLINE LOW BACK PAIN WITH LEFT-SIDED SCIATICA: ICD-10-CM

## 2018-02-20 DIAGNOSIS — M43.16 SPONDYLOLISTHESIS OF LUMBAR REGION: ICD-10-CM

## 2018-02-20 PROCEDURE — 99214 OFFICE O/P EST MOD 30 MIN: CPT | Performed by: PHYSICAL MEDICINE & REHABILITATION

## 2018-02-20 NOTE — PROGRESS NOTES
===================================================================  Oswestry Disability Index Questionnaire Score: 11/20 -> 22%    Interpretation of score:  0-20% - minimal disability - The patients can cope with most living activities.  Usually no treatme

## 2018-02-20 NOTE — H&P
[facility] [place] [address], [address], [facility], [place]    History and Physical    Arlene Gist Patient Status:  No patient class for patient encounter    1960 MRN [id number]   Location [facility] [address], [place] [address], the time. Character of pain: sharp   Where does pain radiate: The pain radiates to the whole left leg. She seems to feel the worse in the left popliteal fossa.   She is unsure whether this is radiating pain from the back or whether this is due to a venous Negative for cough and shortness of breath. Gastrointestinal: Negative for abdominal pain and blood in stool. Genitourinary: Negative for bladder incontinence. Musculoskeletal: Positive for back pain and gait problem.    Skin: Negative for color traylor BILT 1.0 02/06/2018   TP 7.7 02/06/2018   AST 23 02/06/2018   ALT 21 02/06/2018   PTT 30.0 10/18/2016   INR 1.0 10/18/2016   TSH 0.49 02/06/2018               Imaging: MRI of the lumbar spine independently reviewed with patient's.   She has a grade 1 spon

## 2018-02-26 ENCOUNTER — TELEPHONE (OUTPATIENT)
Dept: NEUROLOGY | Facility: CLINIC | Age: 58
End: 2018-02-26

## 2018-02-26 NOTE — TELEPHONE ENCOUNTER
Received in basket from [email or web address] advising of approval for physical therapy. Will call Pt. to inform. Pt. Informed 8 PT sessions were approved. Can proceed with scheduling appt.

## 2018-03-13 ENCOUNTER — OFFICE VISIT (OUTPATIENT)
Dept: PHYSICAL THERAPY | Facility: HOSPITAL | Age: 58
End: 2018-03-13
Attending: PHYSICAL MEDICINE & REHABILITATION
Payer: COMMERCIAL

## 2018-03-13 DIAGNOSIS — M43.16 SPONDYLOLISTHESIS AT L3-L4 LEVEL: ICD-10-CM

## 2018-03-13 PROCEDURE — 97110 THERAPEUTIC EXERCISES: CPT | Performed by: PHYSICAL THERAPIST

## 2018-03-13 PROCEDURE — 97161 PT EVAL LOW COMPLEX 20 MIN: CPT | Performed by: PHYSICAL THERAPIST

## 2018-03-13 NOTE — PROGRESS NOTES
LUMBAR SPINE EVALUATION:   Referring Physician: Dr. Jones Alert  Diagnosis: Spondylolisthesis at L3-L4 level (M43.16)     Evaluation Date: 3/13/2018  Visit # 1  Scheduled Visits 8  Insurance Authorized visits 8 O   Date of Onset: 2016              PATIENT Ankle DF (L4) 5/5 5/5    EHL (L5)      Ankle PF (S1) 5/5 4/5    Hip Abduction      Hip Extension 3+/5 3+/5        Gait: slow antalgic gait with lateral shift to the R  Stairs reciprocal gait with 1 UE support  Balance: SLS R 20sec, L 20sec     ROM:     T to actively participate in planning and for this course of care. Thank you for your referral. Please co-sign or sign and return this letter via fax as soon as possible to [phone].  If you have any questions, please contact me at Dept: [phone]

## 2018-03-15 ENCOUNTER — OFFICE VISIT (OUTPATIENT)
Dept: PHYSICAL THERAPY | Facility: HOSPITAL | Age: 58
End: 2018-03-15
Attending: PHYSICAL MEDICINE & REHABILITATION
Payer: COMMERCIAL

## 2018-03-15 DIAGNOSIS — M43.16 SPONDYLOLISTHESIS AT L3-L4 LEVEL: ICD-10-CM

## 2018-03-15 PROCEDURE — 97110 THERAPEUTIC EXERCISES: CPT | Performed by: PHYSICAL THERAPIST

## 2018-03-15 NOTE — PROGRESS NOTES
Dx: Spondylolisthesis at L3-L4 level (M43.16)               Visit # 2  Fall Risk: standard     Scheduled Visits 8  Precautions: n/a   Insurance Authorized visits  8 HMO        Next MD visit: none scheduled  Evaluation Date 3/13/18  Medication Changes since

## 2018-03-20 ENCOUNTER — OFFICE VISIT (OUTPATIENT)
Dept: PHYSICAL THERAPY | Facility: HOSPITAL | Age: 58
End: 2018-03-20
Attending: PHYSICAL MEDICINE & REHABILITATION
Payer: COMMERCIAL

## 2018-03-20 DIAGNOSIS — M43.16 SPONDYLOLISTHESIS AT L3-L4 LEVEL: ICD-10-CM

## 2018-03-20 PROCEDURE — 97110 THERAPEUTIC EXERCISES: CPT

## 2018-03-20 NOTE — PROGRESS NOTES
Dx: Spondylolisthesis at L3-L4 level (M43.16)               Visit # 3/8  Fall Risk: standard     Scheduled Visits 8  Precautions: n/a   Insurance Authorized visits  8 HMO        Next MD visit: none scheduled  Evaluation Date 3/13/18  Medication Changes sin

## 2018-03-22 ENCOUNTER — OFFICE VISIT (OUTPATIENT)
Dept: PHYSICAL THERAPY | Facility: HOSPITAL | Age: 58
End: 2018-03-22
Attending: PHYSICAL MEDICINE & REHABILITATION
Payer: COMMERCIAL

## 2018-03-22 DIAGNOSIS — M43.16 SPONDYLOLISTHESIS AT L3-L4 LEVEL: ICD-10-CM

## 2018-03-22 PROCEDURE — 97530 THERAPEUTIC ACTIVITIES: CPT

## 2018-03-22 PROCEDURE — 97110 THERAPEUTIC EXERCISES: CPT

## 2018-03-22 NOTE — PROGRESS NOTES
Dx: Spondylolisthesis at L3-L4 level (M43.16)               Visit # 4/8  Fall Risk: standard     Scheduled Visits 8  Precautions: n/a   Insurance Authorized visits  8 HMO        Next MD visit: none scheduled  Evaluation Date 3/13/18  Medication Changes sin

## 2018-03-27 ENCOUNTER — OFFICE VISIT (OUTPATIENT)
Dept: PHYSICAL THERAPY | Facility: HOSPITAL | Age: 58
End: 2018-03-27
Attending: PHYSICAL MEDICINE & REHABILITATION
Payer: COMMERCIAL

## 2018-03-27 DIAGNOSIS — M43.16 SPONDYLOLISTHESIS AT L3-L4 LEVEL: ICD-10-CM

## 2018-03-27 PROCEDURE — 97110 THERAPEUTIC EXERCISES: CPT

## 2018-03-27 NOTE — PROGRESS NOTES
Dx: Spondylolisthesis at L3-L4 level (M43.16)               Visit # 5/8  Fall Risk: standard     Scheduled Visits 8  Precautions: n/a   Insurance Authorized visits  8 HMO        Next MD visit: none scheduled  Evaluation Date 3/13/18  Medication Changes sin mechanics with floor to waist lifting of 10# in order to lift laundry basket  4. Pt to report ability to walk for 1 hour with pain less than 2/10 in order to go grocery shopping  5.  Pt to report 50% reduction in pain when getting up in the [address]

## 2018-03-29 ENCOUNTER — OFFICE VISIT (OUTPATIENT)
Dept: PHYSICAL THERAPY | Facility: HOSPITAL | Age: 58
End: 2018-03-29
Attending: PHYSICAL MEDICINE & REHABILITATION
Payer: COMMERCIAL

## 2018-03-29 DIAGNOSIS — M43.16 SPONDYLOLISTHESIS AT L3-L4 LEVEL: ICD-10-CM

## 2018-03-29 PROCEDURE — 97110 THERAPEUTIC EXERCISES: CPT

## 2018-03-29 NOTE — PROGRESS NOTES
Dx: Spondylolisthesis at L3-L4 level (M43.16)               Visit # 6/8  Fall Risk: standard     Scheduled Visits 8  Precautions: n/a   Insurance Authorized visits  8 HMO        Next MD visit: none scheduled  Evaluation Date 3/13/18  Medication Changes sin shopping  5.  Pt to report 50% reduction in pain when getting up in the mornning    Plan: Cont PT per plan of care for 2 sessions; anticipate discharge      Charges: 2 therex,        Total Timed Treatment: 30 min  Total Treatment Time: 31 min

## 2018-04-03 ENCOUNTER — OFFICE VISIT (OUTPATIENT)
Dept: PHYSICAL THERAPY | Facility: HOSPITAL | Age: 58
End: 2018-04-03
Attending: PHYSICAL MEDICINE & REHABILITATION
Payer: COMMERCIAL

## 2018-04-03 DIAGNOSIS — M43.16 SPONDYLOLISTHESIS AT L3-L4 LEVEL: ICD-10-CM

## 2018-04-03 PROCEDURE — 97110 THERAPEUTIC EXERCISES: CPT

## 2018-04-03 NOTE — PROGRESS NOTES
Dx: Spondylolisthesis at L3-L4 level (M43.16)               Visit # 7/8  Fall Risk: standard     Scheduled Visits 8  Precautions: n/a   Insurance Authorized visits  8 HMO        Next MD visit: none scheduled  Evaluation Date 3/13/18  Medication Changes sin order to go grocery shopping [address]  5.  Pt to report 50% reduction in pain when getting up in the morning GOAL ACHIEVED    Plan: Cont PT per plan of care for 1 session; anticipate discharge      Charges: 3 therex,        Total Timed Treatment: 41 mi

## 2018-04-05 ENCOUNTER — OFFICE VISIT (OUTPATIENT)
Dept: PHYSICAL THERAPY | Facility: HOSPITAL | Age: 58
End: 2018-04-05
Attending: PHYSICAL MEDICINE & REHABILITATION
Payer: COMMERCIAL

## 2018-04-05 DIAGNOSIS — M43.16 SPONDYLOLISTHESIS AT L3-L4 LEVEL: ICD-10-CM

## 2018-04-05 PROCEDURE — 97110 THERAPEUTIC EXERCISES: CPT

## 2018-04-05 NOTE — PROGRESS NOTES
Dx: Spondylolisthesis at L3-L4 level (M43.16)               Visit # 8/8  Fall Risk: standard     Scheduled Visits 8  Precautions: n/a   Insurance Authorized visits  8 HMO        Next MD visit: none scheduled  Evaluation Date 3/13/18  Medication Changes sin sidelying clamshells w/green TB, sit to stand w/green TB, supine hip add w/ball squeeze, supine LTR w/legs on SB    Goals: To be met in 4-6 weeks  1.  Pt to be independent in their home exercise program, its' progression and management of their symptoms- M

## 2018-05-08 ENCOUNTER — TELEPHONE (OUTPATIENT)
Dept: INTERNAL MEDICINE CLINIC | Facility: CLINIC | Age: 58
End: 2018-05-08

## 2018-05-08 DIAGNOSIS — I87.2 PERIPHERAL VENOUS INSUFFICIENCY: Primary | ICD-10-CM

## 2018-05-08 NOTE — TELEPHONE ENCOUNTER
Dru called requesting a referral for pt's follow up visit with Dr Garcaí Savage @ [address] [place] [address]. Pt's has an appt with Dr García Savage this Thursday, 5/10/18. Please sign referral if you agree.  Thank you, [facility]

## 2018-05-08 NOTE — TELEPHONE ENCOUNTER
Please advise on referral request.  Pt was given a referral in 2018, but couldn't get an appt until May. Her referral  at the end of April. Please advise.

## 2018-05-08 NOTE — TELEPHONE ENCOUNTER
I called [facility]. They are able to just extend the date on the old referral since it was never used.   They will fax the new referral.

## 2018-06-25 ENCOUNTER — TELEPHONE (OUTPATIENT)
Dept: INTERNAL MEDICINE CLINIC | Facility: CLINIC | Age: 58
End: 2018-06-25

## 2018-06-25 DIAGNOSIS — H57.9 LEFT EYE SYMPTOMS: Primary | ICD-10-CM

## 2018-06-25 NOTE — TELEPHONE ENCOUNTER
Patient called requesting a referral to see Dr. Gladys Mcdonald because she has a film over her left eye. Pended referral. Please review diagnosis and sign off if you agree.     Thank you,  [facility] [phone]

## 2018-07-03 ENCOUNTER — TELEPHONE (OUTPATIENT)
Dept: INTERNAL MEDICINE CLINIC | Facility: CLINIC | Age: 58
End: 2018-07-03

## 2018-07-03 DIAGNOSIS — I87.2 PERIPHERAL VENOUS INSUFFICIENCY: Primary | ICD-10-CM

## 2018-07-03 NOTE — TELEPHONE ENCOUNTER
Received a call from Diego from the [facility] requesting a referral to see Dr Karina Valdivia on 07/05/2018 for a follow up visit.      Please sign off on referral request.     Thank you, osiris

## 2018-07-25 DIAGNOSIS — I87.2 VENOUS INSUFFICIENCY (CHRONIC) (PERIPHERAL): ICD-10-CM

## 2018-07-25 DIAGNOSIS — A60.09 HERPES GENITALIS IN WOMEN: ICD-10-CM

## 2018-07-28 RX ORDER — VALACYCLOVIR HYDROCHLORIDE 500 MG/1
TABLET, FILM COATED ORAL
Qty: 90 TABLET | Refills: 0 | Status: SHIPPED | OUTPATIENT
Start: 2018-07-28 | End: 2019-02-27

## 2018-07-28 RX ORDER — IBUPROFEN 600 MG/1
TABLET ORAL
Qty: 90 TABLET | Refills: 0 | Status: SHIPPED | OUTPATIENT
Start: 2018-07-28

## 2018-08-24 RX ORDER — IBUPROFEN 600 MG/1
TABLET ORAL
Qty: 90 TABLET | Refills: 0 | Status: SHIPPED | OUTPATIENT
Start: 2018-08-24 | End: 2019-03-05

## 2018-08-24 NOTE — TELEPHONE ENCOUNTER
Medication request: Ibuprofen 600mg 1 tabl q 8hours prn pain#90. No refills.     [id number]  NOV-none scheduled    /Last refill: 7/28/2018

## 2018-10-02 ENCOUNTER — TELEPHONE (OUTPATIENT)
Dept: INTERNAL MEDICINE CLINIC | Facility: CLINIC | Age: 58
End: 2018-10-02

## 2018-10-05 NOTE — TELEPHONE ENCOUNTER
PT HAD  PHYSICAL IN 1/2018 IT IS TOO SOON FOR A PHYSICAL IF PT  DOES  NOT FEEL WELL , SHE SHOULD BE SEEN FIRST FOR EVALAUTION, PLEASE CALL HER

## 2018-10-18 ENCOUNTER — OFFICE VISIT (OUTPATIENT)
Dept: OPHTHALMOLOGY | Facility: CLINIC | Age: 58
End: 2018-10-18

## 2018-10-18 DIAGNOSIS — H40.003 GLAUCOMA SUSPECT OF BOTH EYES: Primary | ICD-10-CM

## 2018-10-18 DIAGNOSIS — H02.883 MEIBOMIAN GLAND DYSFUNCTION (MGD) OF BOTH EYES: ICD-10-CM

## 2018-10-18 DIAGNOSIS — H25.13 AGE-RELATED NUCLEAR CATARACT OF BOTH EYES: ICD-10-CM

## 2018-10-18 DIAGNOSIS — H02.886 MEIBOMIAN GLAND DYSFUNCTION (MGD) OF BOTH EYES: ICD-10-CM

## 2018-10-18 PROBLEM — H20.00 ACUTE IRITIS, RIGHT EYE: Status: RESOLVED | Noted: 2017-11-07 | Resolved: 2018-10-18

## 2018-10-18 PROCEDURE — 99243 OFF/OP CNSLTJ NEW/EST LOW 30: CPT | Performed by: OPHTHALMOLOGY

## 2018-10-18 PROCEDURE — 99212 OFFICE O/P EST SF 10 MIN: CPT | Performed by: OPHTHALMOLOGY

## 2018-10-18 PROCEDURE — 92250 FUNDUS PHOTOGRAPHY W/I&R: CPT | Performed by: OPHTHALMOLOGY

## 2018-10-18 NOTE — PROGRESS NOTES
Srinivasa Story is a 62year old female.     HPI:     HPI     Consult      Additional comments: Consult per Dr. Marlene Goss              Comments      Patient states that about 4 months ago she felt that her  left eye became droopy with a FB sensation which made ROS     Positive for: Eyes    Negative for: Constitutional, Gastrointestinal, Neurological, Skin, Genitourinary, Musculoskeletal, HENT, Endocrine, Cardiovascular, Respiratory, Psychiatric, Allergic/Imm, Heme/Lymph    Last edited by Jonel Patel OT o is needed. Patient was instructed to use warm compresses to the eyelids twice a day everyday. Instructions for warm compress use:   Patient should place wash compresses on both eyelids for 5 minutes every morning and every night.   After 5 minutes o

## 2018-10-18 NOTE — PATIENT INSTRUCTIONS
Meibomian gland dysfunction (MGD) of both eyes  Reassured patient that left eyelid looks normal.  No treatment is needed. Patient was instructed to use warm compresses to the eyelids twice a day everyday.     Instructions for warm compress use:   Hiren

## 2018-10-18 NOTE — ASSESSMENT & PLAN NOTE
Reassured patient that left eyelid looks normal.  No treatment is needed. Patient was instructed to use warm compresses to the eyelids twice a day everyday.     Instructions for warm compress use:   Patient should place wash compresses on both eyelids

## 2018-10-23 ENCOUNTER — NURSE ONLY (OUTPATIENT)
Dept: OPHTHALMOLOGY | Facility: CLINIC | Age: 58
End: 2018-10-23

## 2018-10-23 DIAGNOSIS — H40.003 GLAUCOMA SUSPECT OF BOTH EYES: ICD-10-CM

## 2018-10-23 PROCEDURE — 92133 CPTRZD OPH DX IMG PST SGM ON: CPT | Performed by: OPHTHALMOLOGY

## 2018-10-23 PROCEDURE — 92083 EXTENDED VISUAL FIELD XM: CPT | Performed by: OPHTHALMOLOGY

## 2018-10-23 PROCEDURE — 76514 ECHO EXAM OF EYE THICKNESS: CPT | Performed by: OPHTHALMOLOGY

## 2018-10-24 ENCOUNTER — TELEPHONE (OUTPATIENT)
Dept: OPHTHALMOLOGY | Facility: CLINIC | Age: 58
End: 2018-10-24

## 2018-10-24 RX ORDER — LATANOPROST 50 UG/ML
SOLUTION/ DROPS OPHTHALMIC
Qty: 3 BOTTLE | Refills: 3 | Status: SHIPPED | OUTPATIENT
Start: 2018-10-24 | End: 2019-04-11

## 2018-10-24 NOTE — PATIENT INSTRUCTIONS
What Is Glaucoma? Glaucoma is an eye disease that can cause blindness. If caught early, it can usually be controlled. But it often has no symptoms, so you need regular eye exams. Glaucoma usually begins when pressure builds up in the eye.  This pressur Treatment can prevent or limit vision loss from glaucoma. The goal of treatment is to control glaucoma by lowering eye pressure. Your eye healthcare provider can suggest what treatment is best for you. You may just need more frequent exams.  Medicines and p Open-angle glaucoma is painless. The first symptoms may be loss of side (peripheral) vision. Most people don’t pay much attention to their peripheral vision. So you may have a lot of vision loss before you become aware of the problem.  The vision loss is lo Follow up with your eye doctor, or as advised. Regular appointments will help make sure that your treatment is helping to keep your eyes at a safe pressure. Note: Open-angle glaucoma tends to run in families.  Other family members over the age of 36 should

## 2018-10-24 NOTE — TELEPHONE ENCOUNTER
Spoke with patient and discussed diagnosis of glaucoma in the left eye. Discussed potential side effects of Latanoprost drops such as color changes of the iris, mild redness of the eyes, hyperpigmentation of skin around the eyes and eyelash lengthening.

## 2018-10-24 NOTE — PROGRESS NOTES
José Miguel Chang is a 62year old female.     HPI:     HPI     Pt is here for a VF, OCT and Aziza montero MD.     Last edited by Madeleine Trujillo O.T. on 10/23/2018  4:24 PM. (History)        Patient History:  Past Medical History:   Diagnosis Date   • Back pain 2 this encounter        Follow up instructions:  Return in about 4 months (around 2/23/2019) for IOP and Gonio.     10/24/2018  Scribed by: Ramana Farris MD

## 2019-02-27 ENCOUNTER — OFFICE VISIT (OUTPATIENT)
Dept: OPHTHALMOLOGY | Facility: CLINIC | Age: 59
End: 2019-02-27
Payer: COMMERCIAL

## 2019-02-27 DIAGNOSIS — H25.13 AGE-RELATED NUCLEAR CATARACT OF BOTH EYES: ICD-10-CM

## 2019-02-27 DIAGNOSIS — A60.09 HERPES GENITALIS IN WOMEN: ICD-10-CM

## 2019-02-27 DIAGNOSIS — H40.1122 PRIMARY OPEN ANGLE GLAUCOMA (POAG) OF LEFT EYE, MODERATE STAGE: Primary | ICD-10-CM

## 2019-02-27 DIAGNOSIS — I87.2 VENOUS INSUFFICIENCY (CHRONIC) (PERIPHERAL): ICD-10-CM

## 2019-02-27 PROBLEM — H40.2222: Status: ACTIVE | Noted: 2019-02-27

## 2019-02-27 PROCEDURE — 92020 GONIOSCOPY: CPT | Performed by: OPHTHALMOLOGY

## 2019-02-27 PROCEDURE — 99213 OFFICE O/P EST LOW 20 MIN: CPT | Performed by: OPHTHALMOLOGY

## 2019-02-27 PROCEDURE — 92015 DETERMINE REFRACTIVE STATE: CPT | Performed by: OPHTHALMOLOGY

## 2019-02-27 PROCEDURE — 99212 OFFICE O/P EST SF 10 MIN: CPT | Performed by: OPHTHALMOLOGY

## 2019-02-27 NOTE — PROGRESS NOTES
Sid Guzman is a 62year old female. HPI:     HPI     Consult      Additional comments: Per Dr. Sadiq Barajas is here for an IOP check and Gonio after starting Latanoprost OS QHS due to abnormal VF and OCT.  Pt states she was fal Cyclobenzaprine HCl 10 MG Oral Tab Take 1 tablet (10 mg total) by mouth nightly.  Disp: 90 tablet Rfl: 0       Allergies:    Sulfur                  ITCHING    ROS:       PHYSICAL EXAM:     Base Eye Exam     Visual Acuity (Snellen - Linear)       Right Le Distance only          Final Rx #3       Sphere Cylinder Marcus Dist [facility] Add Near [place]    Right +1.50 +0.50 010   20/20    Left +1.25 +0.50 015   20/20    Type:  Reading only                 ASSESSMENT/PLAN:     Diagnoses and Plan:     Primary open angle glauco

## 2019-02-27 NOTE — ASSESSMENT & PLAN NOTE
IOP has not improved since starting Latanoprost.  Continue Latanoprost once a day in the left eye- okay to use it in the morning if that works better for her. Gonioscopy completed in office today with results of open angles 360 degrees.    Discussed with mario

## 2019-02-27 NOTE — PATIENT INSTRUCTIONS
Primary open angle glaucoma (POAG) of left eye, moderate stage  IOP has not improved since starting Latanoprost.  Continue Latanoprost once a day in the left eye- okay to use it in the morning if that works better for her.   Gonioscopy completed in office t

## 2019-03-02 RX ORDER — VALACYCLOVIR HYDROCHLORIDE 500 MG/1
TABLET, FILM COATED ORAL
Qty: 90 TABLET | Refills: 0 | Status: SHIPPED | OUTPATIENT
Start: 2019-03-02 | End: 2019-09-24

## 2019-03-02 NOTE — TELEPHONE ENCOUNTER
This call patient I refilled medication for 90 days she is due for follow-up visit can come for a physical exam if she chooses

## 2019-03-05 ENCOUNTER — LAB ENCOUNTER (OUTPATIENT)
Dept: LAB | Age: 59
End: 2019-03-05
Attending: INTERNAL MEDICINE
Payer: COMMERCIAL

## 2019-03-05 ENCOUNTER — OFFICE VISIT (OUTPATIENT)
Dept: INTERNAL MEDICINE CLINIC | Facility: CLINIC | Age: 59
End: 2019-03-05
Payer: COMMERCIAL

## 2019-03-05 ENCOUNTER — APPOINTMENT (OUTPATIENT)
Dept: LAB | Age: 59
End: 2019-03-05
Attending: INTERNAL MEDICINE
Payer: COMMERCIAL

## 2019-03-05 VITALS
SYSTOLIC BLOOD PRESSURE: 114 MMHG | BODY MASS INDEX: 30.7 KG/M2 | HEIGHT: 66 IN | HEART RATE: 106 BPM | RESPIRATION RATE: 25 BRPM | DIASTOLIC BLOOD PRESSURE: 67 MMHG | TEMPERATURE: 98 F | WEIGHT: 191 LBS

## 2019-03-05 DIAGNOSIS — R92.2 DENSE BREAST TISSUE: ICD-10-CM

## 2019-03-05 DIAGNOSIS — Z00.00 PHYSICAL EXAM, ANNUAL: ICD-10-CM

## 2019-03-05 DIAGNOSIS — Z12.31 BREAST CANCER SCREENING BY MAMMOGRAM: ICD-10-CM

## 2019-03-05 DIAGNOSIS — R00.2 PALPITATION: Primary | ICD-10-CM

## 2019-03-05 DIAGNOSIS — R00.2 PALPITATION: ICD-10-CM

## 2019-03-05 LAB
ALBUMIN SERPL-MCNC: 4.1 G/DL (ref 3.4–5)
ALBUMIN/GLOB SERPL: 1 {RATIO} (ref 1–2)
ALP LIVER SERPL-CCNC: 91 U/L (ref 46–118)
ALT SERPL-CCNC: 27 U/L (ref 13–56)
ANION GAP SERPL CALC-SCNC: 9 MMOL/L (ref 0–18)
AST SERPL-CCNC: 21 U/L (ref 15–37)
BASOPHILS # BLD AUTO: 0.02 X10(3) UL (ref 0–0.2)
BASOPHILS NFR BLD AUTO: 0.4 %
BILIRUB SERPL-MCNC: 0.7 MG/DL (ref 0.1–2)
BILIRUB UR QL: NEGATIVE
BUN BLD-MCNC: 8 MG/DL (ref 7–18)
BUN/CREAT SERPL: 11.6 (ref 10–20)
CALCIUM BLD-MCNC: 9.4 MG/DL (ref 8.5–10.1)
CHLORIDE SERPL-SCNC: 109 MMOL/L (ref 98–107)
CHOLEST SMN-MCNC: 247 MG/DL (ref ?–200)
CLARITY UR: CLEAR
CO2 SERPL-SCNC: 25 MMOL/L (ref 21–32)
COLOR UR: YELLOW
CREAT BLD-MCNC: 0.69 MG/DL (ref 0.55–1.02)
DEPRECATED RDW RBC AUTO: 45.7 FL (ref 35.1–46.3)
EOSINOPHIL # BLD AUTO: 0.06 X10(3) UL (ref 0–0.7)
EOSINOPHIL NFR BLD AUTO: 1.1 %
ERYTHROCYTE [DISTWIDTH] IN BLOOD BY AUTOMATED COUNT: 14.5 % (ref 11–15)
GLOBULIN PLAS-MCNC: 4.1 G/DL (ref 2.8–4.4)
GLUCOSE BLD-MCNC: 82 MG/DL (ref 70–99)
GLUCOSE UR-MCNC: NEGATIVE MG/DL
HCT VFR BLD AUTO: 40.9 % (ref 35–48)
HDLC SERPL-MCNC: 66 MG/DL (ref 40–59)
HGB BLD-MCNC: 13.2 G/DL (ref 12–16)
HGB UR QL STRIP.AUTO: NEGATIVE
IMM GRANULOCYTES # BLD AUTO: 0.01 X10(3) UL (ref 0–1)
IMM GRANULOCYTES NFR BLD: 0.2 %
KETONES UR-MCNC: 20 MG/DL
LDLC SERPL CALC-MCNC: 163 MG/DL (ref ?–100)
LEUKOCYTE ESTERASE UR QL STRIP.AUTO: NEGATIVE
LYMPHOCYTES # BLD AUTO: 2.26 X10(3) UL (ref 1–4)
LYMPHOCYTES NFR BLD AUTO: 42.1 %
M PROTEIN MFR SERPL ELPH: 8.2 G/DL (ref 6.4–8.2)
MCH RBC QN AUTO: 27.8 PG (ref 26–34)
MCHC RBC AUTO-ENTMCNC: 32.3 G/DL (ref 31–37)
MCV RBC AUTO: 86.1 FL (ref 80–100)
MONOCYTES # BLD AUTO: 0.39 X10(3) UL (ref 0.1–1)
MONOCYTES NFR BLD AUTO: 7.3 %
NEUTROPHILS # BLD AUTO: 2.63 X10 (3) UL (ref 1.5–7.7)
NEUTROPHILS # BLD AUTO: 2.63 X10(3) UL (ref 1.5–7.7)
NEUTROPHILS NFR BLD AUTO: 48.9 %
NITRITE UR QL STRIP.AUTO: NEGATIVE
NONHDLC SERPL-MCNC: 181 MG/DL (ref ?–130)
OSMOLALITY SERPL CALC.SUM OF ELEC: 293 MOSM/KG (ref 275–295)
PH UR: 5 [PH] (ref 5–8)
PLATELET # BLD AUTO: 242 10(3)UL (ref 150–450)
POTASSIUM SERPL-SCNC: 3.6 MMOL/L (ref 3.5–5.1)
PROT UR-MCNC: 30 MG/DL
RBC # BLD AUTO: 4.75 X10(6)UL (ref 3.8–5.3)
RBC #/AREA URNS AUTO: 2 /HPF
SODIUM SERPL-SCNC: 143 MMOL/L (ref 136–145)
SP GR UR STRIP: 1.02 (ref 1–1.03)
TRIGL SERPL-MCNC: 90 MG/DL (ref 30–149)
TSI SER-ACNC: 0.79 MIU/ML (ref 0.36–3.74)
UROBILINOGEN UR STRIP-ACNC: <2
VIT C UR-MCNC: NEGATIVE MG/DL
VLDLC SERPL CALC-MCNC: 18 MG/DL (ref 0–30)
WBC # BLD AUTO: 5.4 X10(3) UL (ref 4–11)
WBC #/AREA URNS AUTO: <1 /HPF

## 2019-03-05 PROCEDURE — 80053 COMPREHEN METABOLIC PANEL: CPT

## 2019-03-05 PROCEDURE — 85025 COMPLETE CBC W/AUTO DIFF WBC: CPT

## 2019-03-05 PROCEDURE — 93010 ELECTROCARDIOGRAM REPORT: CPT | Performed by: INTERNAL MEDICINE

## 2019-03-05 PROCEDURE — 80061 LIPID PANEL: CPT

## 2019-03-05 PROCEDURE — 81001 URINALYSIS AUTO W/SCOPE: CPT

## 2019-03-05 PROCEDURE — 36415 COLL VENOUS BLD VENIPUNCTURE: CPT

## 2019-03-05 PROCEDURE — 99396 PREV VISIT EST AGE 40-64: CPT | Performed by: INTERNAL MEDICINE

## 2019-03-05 PROCEDURE — 93005 ELECTROCARDIOGRAM TRACING: CPT

## 2019-03-05 PROCEDURE — 84443 ASSAY THYROID STIM HORMONE: CPT

## 2019-03-05 NOTE — PROGRESS NOTES
HPI:    Patient ID: Alexandru Soto is a 62year old female. Presents for physical exam    HPI  Patient states that she has been feeling well overall, she retired 4 months ago, keeps busy taking care of grandchildren.   Continues to have leg fatigue and don Tab TAKE 1 TABLET(500 MG) BY MOUTH DAILY Disp: 90 tablet Rfl: 0   latanoprost 0.005 % Ophthalmic Solution Instill 1 drop by ophthalmic route every night into left eye Disp: 3 Bottle Rfl: 3   IBUPROFEN 600 MG Oral Tab TAKE 1 TABLET(600 MG) BY MOUTH EVERY 8 motor deficit. Skin: Skin is warm and dry. Prominent bilateral varicose veins superficial and deeper up to the both groins   Psychiatric: She has a normal mood and affect.  Her behavior is normal. Judgment normal.            ASSESSMENT/PLAN:   Physical

## 2019-03-06 ENCOUNTER — TELEPHONE (OUTPATIENT)
Dept: INTERNAL MEDICINE CLINIC | Facility: CLINIC | Age: 59
End: 2019-03-06

## 2019-03-06 DIAGNOSIS — R82.90 ABNORMAL RESULT ON SCREENING URINE TEST: Primary | ICD-10-CM

## 2019-04-11 ENCOUNTER — TELEPHONE (OUTPATIENT)
Dept: OPHTHALMOLOGY | Facility: CLINIC | Age: 59
End: 2019-04-11

## 2019-04-11 RX ORDER — LATANOPROST 50 UG/ML
SOLUTION/ DROPS OPHTHALMIC
Qty: 3 BOTTLE | Refills: 3 | Status: SHIPPED | OUTPATIENT
Start: 2019-04-11 | End: 2020-06-03

## 2019-04-11 NOTE — TELEPHONE ENCOUNTER
Pt needs 90 day supply of latanoprost 0.005 % Ophthalmic Solution. States ins is not covering 30 day supply anymore. pls advise thank you.

## 2019-06-26 ENCOUNTER — OFFICE VISIT (OUTPATIENT)
Dept: OPHTHALMOLOGY | Facility: CLINIC | Age: 59
End: 2019-06-26
Payer: COMMERCIAL

## 2019-06-26 DIAGNOSIS — H40.1122 PRIMARY OPEN ANGLE GLAUCOMA (POAG) OF LEFT EYE, MODERATE STAGE: Primary | ICD-10-CM

## 2019-06-26 PROCEDURE — 99212 OFFICE O/P EST SF 10 MIN: CPT | Performed by: OPHTHALMOLOGY

## 2019-06-26 PROCEDURE — 99213 OFFICE O/P EST LOW 20 MIN: CPT | Performed by: OPHTHALMOLOGY

## 2019-06-26 NOTE — PROGRESS NOTES
Ora Saldana is a 62year old female. HPI:     HPI     Pt is here for an IOP check. Pt is using Latanoprost OS QHS. Pt states vision is stable, she has no ocular complaints at this time.      Last edited by Marito Vázquez OT on 6/26/2019 11:04 AM. (His 12          Pachymetry (10/23/2018)       Right Left    Thickness 552/-1 542/+0          Pupils       Pupils    Right PERRL    Left PERRL            Slit Lamp and Fundus Exam     Slit Lamp Exam       Right Left    Lids/Lashes Meibomian gland dysfunction Me

## 2019-06-26 NOTE — PATIENT INSTRUCTIONS
Primary open angle glaucoma (POAG) of left eye, moderate stage  IOP has not improved since starting Latanoprost.  Continue Latanoprost once a day in the left eye- okay to use it in the morning if that works better for her.         Discussed potential side e

## 2019-06-26 NOTE — ASSESSMENT & PLAN NOTE
IOP has not improved since starting Latanoprost.  Continue Latanoprost once a day in the left eye- okay to use it in the morning if that works better for her.         Discussed potential side effects of Latanoprost drops such as color changes of the iris, m

## 2019-06-29 ENCOUNTER — HOSPITAL ENCOUNTER (OUTPATIENT)
Dept: MAMMOGRAPHY | Facility: HOSPITAL | Age: 59
Discharge: HOME | End: 2019-06-29
Attending: INTERNAL MEDICINE
Payer: COMMERCIAL

## 2019-06-29 DIAGNOSIS — R92.2 DENSE BREAST TISSUE: ICD-10-CM

## 2019-06-29 PROCEDURE — 77063 BREAST TOMOSYNTHESIS BI: CPT | Performed by: INTERNAL MEDICINE

## 2019-06-29 PROCEDURE — 77067 SCR MAMMO BI INCL CAD: CPT | Performed by: INTERNAL MEDICINE

## 2019-07-12 ENCOUNTER — TELEPHONE (OUTPATIENT)
Dept: INTERNAL MEDICINE CLINIC | Facility: CLINIC | Age: 59
End: 2019-07-12

## 2019-07-12 NOTE — TELEPHONE ENCOUNTER
Pt states she is out of this medication. She has been taking it for a long time for shingles . She also states her insurance will only cover meds sent to [facility]  Preferred pharmacy is [facility]      Last Rx - 3/2  90 tab, but pt only receive 30.   Sp

## 2019-07-17 ENCOUNTER — HOSPITAL ENCOUNTER (OUTPATIENT)
Dept: MAMMOGRAPHY | Facility: HOSPITAL | Age: 59
Discharge: HOME | End: 2019-07-17
Attending: INTERNAL MEDICINE
Payer: COMMERCIAL

## 2019-07-17 DIAGNOSIS — R92.8 ABNORMAL MAMMOGRAM: ICD-10-CM

## 2019-07-17 PROCEDURE — 77065 DX MAMMO INCL CAD UNI: CPT | Performed by: INTERNAL MEDICINE

## 2019-07-17 PROCEDURE — 77061 BREAST TOMOSYNTHESIS UNI: CPT | Performed by: INTERNAL MEDICINE

## 2019-09-12 DIAGNOSIS — I87.2 VENOUS INSUFFICIENCY (CHRONIC) (PERIPHERAL): ICD-10-CM

## 2019-09-12 DIAGNOSIS — A60.09 HERPES GENITALIS IN WOMEN: ICD-10-CM

## 2019-09-12 RX ORDER — VALACYCLOVIR HYDROCHLORIDE 500 MG/1
TABLET, FILM COATED ORAL
Qty: 90 TABLET | Refills: 0 | OUTPATIENT
Start: 2019-09-12

## 2019-09-24 DIAGNOSIS — I87.2 VENOUS INSUFFICIENCY (CHRONIC) (PERIPHERAL): ICD-10-CM

## 2019-09-24 DIAGNOSIS — A60.09 HERPES GENITALIS IN WOMEN: ICD-10-CM

## 2019-09-25 RX ORDER — VALACYCLOVIR HYDROCHLORIDE 500 MG/1
TABLET, FILM COATED ORAL
Qty: 90 TABLET | Refills: 0 | Status: SHIPPED | OUTPATIENT
Start: 2019-09-25 | End: 2019-12-18

## 2019-09-26 NOTE — TELEPHONE ENCOUNTER
Refill passed per [facility] protocol.   Refill Protocol Appointment Criteria  · Appointment scheduled in the past 12 months or in the next 3 months  Recent Outpatient Visits            3 months ago Primary open angle glaucoma (POAG) of left eye, moder

## 2019-10-29 ENCOUNTER — OFFICE VISIT (OUTPATIENT)
Dept: OPHTHALMOLOGY | Facility: CLINIC | Age: 59
End: 2019-10-29
Payer: COMMERCIAL

## 2019-10-29 DIAGNOSIS — H40.1122 PRIMARY OPEN ANGLE GLAUCOMA (POAG) OF LEFT EYE, MODERATE STAGE: Primary | ICD-10-CM

## 2019-10-29 DIAGNOSIS — H25.13 AGE-RELATED NUCLEAR CATARACT OF BOTH EYES: ICD-10-CM

## 2019-10-29 PROCEDURE — 92014 COMPRE OPH EXAM EST PT 1/>: CPT | Performed by: OPHTHALMOLOGY

## 2019-10-29 PROCEDURE — 92015 DETERMINE REFRACTIVE STATE: CPT | Performed by: OPHTHALMOLOGY

## 2019-10-29 PROCEDURE — 92133 CPTRZD OPH DX IMG PST SGM ON: CPT | Performed by: OPHTHALMOLOGY

## 2019-10-29 PROCEDURE — 92083 EXTENDED VISUAL FIELD XM: CPT | Performed by: OPHTHALMOLOGY

## 2019-10-29 NOTE — PATIENT INSTRUCTIONS
Primary open angle glaucoma (POAG) of left eye, moderate stage  Visual field and OCT completed in office today with normal results in the right eye and stable results in the left eye. Tests  were discussed with patient in office today.       Lluvia Mosley

## 2019-10-29 NOTE — ASSESSMENT & PLAN NOTE
Visual field and OCT completed in office today with normal results in the right eye and stable results in the left eye. Tests  were discussed with patient in office today.       Continue Latanoprost once a day in the left eye- okay to use it in the morning

## 2019-10-29 NOTE — ASSESSMENT & PLAN NOTE
If she wants to try over the counter glasses for reading, suggest +1.25 or +1.50  over the counter glasses for reading. RX for separate distance and reading glasses per patient's choice.

## 2019-10-29 NOTE — PROGRESS NOTES
Hakan Najera is a 61year old female. HPI:     HPI     Pt is here for a VF, OCT and complete exam. Pt is taking Latanoprost OS QHS as directed. Pt states vision is stable. Pt complains of OS feels sore sometimes.  Pt would like an updated glasses Rx to (Snellen - Linear)       Right Left    Dist cc 20/20 -1 20/20 -1    Near cc 20/20 20/20   Checked DVA with last Rx in phoropter            Tonometry (Applanation, 10:39 AM)       Right Left    Pressure 13 13          Pachymetry (10/23/2018)       Right Lef ASSESSMENT/PLAN:     Diagnoses and Plan:     Primary open angle glaucoma (POAG) of left eye, moderate stage  Visual field and OCT completed in office today with normal results in the right eye and stable results in the left eye.   Tests  were discussed wi

## 2019-12-18 DIAGNOSIS — I87.2 VENOUS INSUFFICIENCY (CHRONIC) (PERIPHERAL): ICD-10-CM

## 2019-12-18 DIAGNOSIS — A60.09 HERPES GENITALIS IN WOMEN: ICD-10-CM

## 2019-12-18 RX ORDER — VALACYCLOVIR HYDROCHLORIDE 500 MG/1
TABLET, FILM COATED ORAL
Qty: 90 TABLET | Refills: 0 | Status: SHIPPED | OUTPATIENT
Start: 2019-12-18

## 2020-06-03 ENCOUNTER — OFFICE VISIT (OUTPATIENT)
Dept: OPHTHALMOLOGY | Facility: CLINIC | Age: 60
End: 2020-06-03
Payer: COMMERCIAL

## 2020-06-03 DIAGNOSIS — H40.1122 PRIMARY OPEN ANGLE GLAUCOMA (POAG) OF LEFT EYE, MODERATE STAGE: Primary | ICD-10-CM

## 2020-06-03 PROCEDURE — 99213 OFFICE O/P EST LOW 20 MIN: CPT | Performed by: OPHTHALMOLOGY

## 2020-06-03 PROCEDURE — 99212 OFFICE O/P EST SF 10 MIN: CPT | Performed by: OPHTHALMOLOGY

## 2020-06-03 RX ORDER — LATANOPROST 50 UG/ML
SOLUTION/ DROPS OPHTHALMIC
Qty: 3 BOTTLE | Refills: 3 | Status: SHIPPED | OUTPATIENT
Start: 2020-06-03 | End: 2021-03-15

## 2020-06-03 NOTE — PATIENT INSTRUCTIONS
Primary open angle glaucoma (POAG) of left eye, moderate stage  Continue Latanoprost once a day in the left eye- okay to use it in the morning if that works better for her. Will have patient back in 4 months for VF, OCT, EE and Photos.

## 2020-06-03 NOTE — PROGRESS NOTES
Maxime Rocha is a 61year old female. HPI:     HPI     Patient is here for an IOP check. She is taking Latanoprost OS QAM as directed.       Last edited by Colette Thorpe OT on 6/3/2020  1:11 PM. (History)        Patient History:  Past Medical Histor Pachymetry (10/23/2018)       Right Left    Thickness 552/-1 542/+0          Pupils       Pupils    Right PERRL    Left PERRL            Slit Lamp and Fundus Exam     Slit Lamp Exam       Right Left    Lids/Lashes Meibomian gland dysfunction Meibomian glan

## 2020-06-03 NOTE — ASSESSMENT & PLAN NOTE
Continue Latanoprost once a day in the left eye- okay to use it in the morning if that works better for her. Will have patient back in 4 months for VF, OCT, EE and Photos.

## 2020-10-08 ENCOUNTER — OFFICE VISIT (OUTPATIENT)
Dept: OPHTHALMOLOGY | Facility: CLINIC | Age: 60
End: 2020-10-08
Payer: COMMERCIAL

## 2020-10-08 DIAGNOSIS — H25.13 AGE-RELATED NUCLEAR CATARACT OF BOTH EYES: ICD-10-CM

## 2020-10-08 DIAGNOSIS — H40.1122 PRIMARY OPEN ANGLE GLAUCOMA (POAG) OF LEFT EYE, MODERATE STAGE: Primary | ICD-10-CM

## 2020-10-08 PROBLEM — H40.003 GLAUCOMA SUSPECT OF BOTH EYES: Status: RESOLVED | Noted: 2018-10-18 | Resolved: 2020-10-08

## 2020-10-08 PROCEDURE — 92250 FUNDUS PHOTOGRAPHY W/I&R: CPT | Performed by: OPHTHALMOLOGY

## 2020-10-08 PROCEDURE — 92133 CPTRZD OPH DX IMG PST SGM ON: CPT | Performed by: OPHTHALMOLOGY

## 2020-10-08 PROCEDURE — 92083 EXTENDED VISUAL FIELD XM: CPT | Performed by: OPHTHALMOLOGY

## 2020-10-08 PROCEDURE — 92015 DETERMINE REFRACTIVE STATE: CPT | Performed by: OPHTHALMOLOGY

## 2020-10-08 PROCEDURE — 92014 COMPRE OPH EXAM EST PT 1/>: CPT | Performed by: OPHTHALMOLOGY

## 2020-10-08 NOTE — ASSESSMENT & PLAN NOTE
Visual field and OCT completed in office today with normal results in the right eye that show no sign of glaucoma in the right eye at this time. The visual field and OCT are stable results in the left eye. Results were discussed with patient in office.

## 2020-10-08 NOTE — ASSESSMENT & PLAN NOTE
If she wants to try over the counter glasses for reading, suggest +1.25 or +1.50  over the counter glasses for reading. RX for separate distance and reading glasses per patient's choice, but no change is needed in glasses.

## 2020-10-08 NOTE — PATIENT INSTRUCTIONS
Primary open angle glaucoma (POAG) of left eye, moderate stage  Visual field and OCT completed in office today with normal results in the right eye that show no sign of glaucoma in the right eye at this time.     The visual field and OCT are stable results

## 2020-10-08 NOTE — PROGRESS NOTES
Rylie Carlos is a 61year old female. HPI:     HPI     Pt is here for a VF, OCT, complete exam and photos. Pt is taking Latanoprost OS QAM as directed. Pt states vision is stable, she would like an updated glasses Rx today.      Last edited by Matt Paul, last Rx in phoropter           Tonometry (Applanation, 9:50 AM)       Right Left    Pressure 13 14          Pachymetry (10/23/2018)       Right Left    Thickness 552/-1 542/+0          Pupils       Pupils    Right PERRL    Left PERRL          Visual Fields with normal results in the right eye that show no sign of glaucoma in the right eye at this time. The visual field and OCT are stable results in the left eye. Results were discussed with patient in office.      Continue Latanoprost once a day in the lef

## 2021-01-24 ENCOUNTER — TELEPHONE (OUTPATIENT)
Dept: INTERNAL MEDICINE CLINIC | Facility: CLINIC | Age: 61
End: 2021-01-24

## 2021-01-24 NOTE — TELEPHONE ENCOUNTER
On-call  Return patient's call at [phone]  Patient called with complaints of sore throat chills but no fevers  \"Not good with salt water gargling\"  Did advise patient for an evaluation through walk-in clinic/urgent care etc.  Patient aware and agreed

## 2021-01-25 ENCOUNTER — HOSPITAL ENCOUNTER (OUTPATIENT)
Age: 61
Discharge: HOME | End: 2021-01-25
Payer: COMMERCIAL

## 2021-01-25 VITALS
TEMPERATURE: 98 F | OXYGEN SATURATION: 100 % | RESPIRATION RATE: 18 BRPM | HEART RATE: 96 BPM | SYSTOLIC BLOOD PRESSURE: 145 MMHG | DIASTOLIC BLOOD PRESSURE: 71 MMHG

## 2021-01-25 DIAGNOSIS — R53.83 FATIGUE, UNSPECIFIED TYPE: ICD-10-CM

## 2021-01-25 DIAGNOSIS — J02.0 STREPTOCOCCAL PHARYNGITIS: Primary | ICD-10-CM

## 2021-01-25 LAB
#MXD IC: 0.9 X10ˆ3/UL (ref 0.1–1)
CREAT BLD-MCNC: 0.6 MG/DL
GLUCOSE BLD-MCNC: 115 MG/DL (ref 70–99)
HCT VFR BLD AUTO: 41 %
HGB BLD-MCNC: 13.1 G/DL
ISTAT BUN: 6 MG/DL (ref 7–18)
ISTAT CHLORIDE: 98 MMOL/L (ref 98–112)
ISTAT HEMATOCRIT: 45 %
ISTAT IONIZED CALCIUM FOR CHEM 8: 1.14 MMOL/L (ref 1.12–1.32)
ISTAT POTASSIUM: 3.4 MMOL/L (ref 3.6–5.1)
ISTAT SODIUM: 140 MMOL/L (ref 136–145)
ISTAT TCO2: 34 MMOL/L (ref 21–32)
LYMPHOCYTES # BLD AUTO: 2 X10ˆ3/UL (ref 1–4)
LYMPHOCYTES NFR BLD AUTO: 15.9 %
MCH RBC QN AUTO: 27.3 PG (ref 26–34)
MCHC RBC AUTO-ENTMCNC: 32 G/DL (ref 31–37)
MCV RBC AUTO: 85.6 FL (ref 80–100)
MIXED CELL %: 6.8 %
NEUTROPHILS # BLD AUTO: 9.8 X10ˆ3/UL (ref 1.5–7.7)
NEUTROPHILS NFR BLD AUTO: 77.3 %
PLATELET # BLD AUTO: 246 X10ˆ3/UL (ref 150–450)
RBC # BLD AUTO: 4.79 X10ˆ6/UL
S PYO AG THROAT QL: POSITIVE
SARS-COV-2 RNA RESP QL NAA+PROBE: NOT DETECTED
WBC # BLD AUTO: 12.7 X10ˆ3/UL (ref 4–11)

## 2021-01-25 PROCEDURE — 99204 OFFICE O/P NEW MOD 45 MIN: CPT | Performed by: NURSE PRACTITIONER

## 2021-01-25 PROCEDURE — 99214 OFFICE O/P EST MOD 30 MIN: CPT | Performed by: NURSE PRACTITIONER

## 2021-01-25 PROCEDURE — 93010 ELECTROCARDIOGRAM REPORT: CPT | Performed by: NURSE PRACTITIONER

## 2021-01-25 PROCEDURE — 96360 HYDRATION IV INFUSION INIT: CPT | Performed by: NURSE PRACTITIONER

## 2021-01-25 PROCEDURE — 85025 COMPLETE CBC W/AUTO DIFF WBC: CPT | Performed by: NURSE PRACTITIONER

## 2021-01-25 PROCEDURE — 80047 BASIC METABLC PNL IONIZED CA: CPT

## 2021-01-25 PROCEDURE — 93005 ELECTROCARDIOGRAM TRACING: CPT

## 2021-01-25 PROCEDURE — 87880 STREP A ASSAY W/OPTIC: CPT

## 2021-01-25 RX ORDER — DEXAMETHASONE SODIUM PHOSPHATE 10 MG/ML
10 INJECTION, SOLUTION INTRAMUSCULAR; INTRAVENOUS ONCE
Status: COMPLETED | OUTPATIENT
Start: 2021-01-25 | End: 2021-01-25

## 2021-01-25 RX ORDER — AMOXICILLIN 500 MG/1
500 TABLET, FILM COATED ORAL 2 TIMES DAILY
Qty: 20 TABLET | Refills: 0 | Status: SHIPPED | OUTPATIENT
Start: 2021-01-25 | End: 2021-02-04

## 2021-01-25 RX ORDER — ACETAMINOPHEN 325 MG/1
650 TABLET ORAL ONCE
Status: COMPLETED | OUTPATIENT
Start: 2021-01-25 | End: 2021-01-25

## 2021-01-25 RX ORDER — FLUCONAZOLE 150 MG/1
150 TABLET ORAL ONCE
Qty: 1 TABLET | Refills: 0 | Status: SHIPPED | OUTPATIENT
Start: 2021-01-25 | End: 2021-01-25

## 2021-01-25 RX ORDER — SODIUM CHLORIDE 9 MG/ML
1000 INJECTION, SOLUTION INTRAVENOUS ONCE
Status: COMPLETED | OUTPATIENT
Start: 2021-01-25 | End: 2021-01-25

## 2021-01-25 NOTE — ED INITIAL ASSESSMENT (HPI)
PATIENT REPORTS CHILLS, SORE THROAT, FATIGUE, NAUSEA AND POOR APPETITE FOR THE LAST WEEK. DENIES ILL CONTACTS.

## 2021-01-25 NOTE — ED PROVIDER NOTES
Patient Seen in: [facility]      History   Patient presents with:  Sore Throat    Stated Complaint: flu like symptoms, not able to eat, throat is very sore    HPI/Subjective:   HPI    This is a 80-year-old female presenting with sore throat, Mouth/Throat:      Mouth: Mucous membranes are moist.      Pharynx: Oropharynx is clear. Posterior oropharyngeal erythema present. Tonsils: 2+ on the right. 2+ on the left.    Eyes:      Conjunctiva/sclera: Conjunctivae normal.   Neck:      Musculoskel Tylenol and Decadron as patient expressed that she has not been eating and drinking much. Rapid Covid will also be collected.   If vital signs improved patient is feeling better she will be discharged home with amoxicillin to closely follow-up with her curtis (150 mg total) by mouth once for 1 dose., Normal, Disp-1 tablet, R-0

## 2021-02-11 ENCOUNTER — OFFICE VISIT (OUTPATIENT)
Dept: OPHTHALMOLOGY | Facility: CLINIC | Age: 61
End: 2021-02-11
Payer: COMMERCIAL

## 2021-02-11 DIAGNOSIS — H40.1122 PRIMARY OPEN ANGLE GLAUCOMA (POAG) OF LEFT EYE, MODERATE STAGE: Primary | ICD-10-CM

## 2021-02-11 PROCEDURE — 99213 OFFICE O/P EST LOW 20 MIN: CPT | Performed by: OPHTHALMOLOGY

## 2021-02-11 NOTE — ASSESSMENT & PLAN NOTE
Continue Latanoprost once a day in the left eye- okay to use it in the morning if that works better for her. Will have patient back in 4 months for IOP check.

## 2021-02-11 NOTE — PATIENT INSTRUCTIONS
Primary open angle glaucoma (POAG) of left eye, moderate stage  Continue Latanoprost once a day in the left eye- okay to use it in the morning if that works better for her. Will have patient back in 4 months for IOP check.

## 2021-02-11 NOTE — PROGRESS NOTES
Sid Guzman is a 61year old female. HPI:     HPI     Patient is here for an IOP check. She is taking Latanoprost OS QAM as directed.       Last edited by Janene Langford OT on 2/11/2021  9:52 AM. (History)        Patient History:  Past Medical Histo Thickness 552/-1 542/+0          Pupils       Pupils    Right PERRL    Left PERRL            Slit Lamp and Fundus Exam     Slit Lamp Exam       Right Left    Lids/Lashes Meibomian gland dysfunction Meibomian gland dysfunction    Conjunctiva/Sclera Melanosi

## 2021-03-15 RX ORDER — LATANOPROST 50 UG/ML
SOLUTION/ DROPS OPHTHALMIC
Qty: 3 BOTTLE | Refills: 3 | Status: SHIPPED | OUTPATIENT
Start: 2021-03-15 | End: 2021-08-31

## 2021-03-15 NOTE — TELEPHONE ENCOUNTER
Sent to [facility] on [address], per prior message.      LDE:10/8/2020  Last visit: 2/11/21  Due for: IOP check   Upcoming visit: 6/16/21    Routed to [facility]

## 2021-03-15 NOTE — TELEPHONE ENCOUNTER
Pt called stating pt ran out the rx. Latanoprost.  Pt called the pharmacy but they have no record of the rx. Pt has not used the drops for 3 days. Please send the rx. [facility].   Call pt

## 2021-05-07 ENCOUNTER — TELEPHONE (OUTPATIENT)
Dept: INTERNAL MEDICINE CLINIC | Facility: CLINIC | Age: 61
End: 2021-05-07

## 2021-05-07 RX ORDER — LORATADINE AND PSEUDOEPHEDRINE SULFATE 10; 240 MG/1; MG/1
1 TABLET, EXTENDED RELEASE ORAL DAILY
Qty: 30 TABLET | Refills: 0 | Status: SHIPPED | OUTPATIENT
Start: 2021-05-07 | End: 2021-07-23

## 2021-05-07 NOTE — TELEPHONE ENCOUNTER
Patient calling for refill on Claritin-D, increased sinus symptoms for months. Patient has been taking Claritin OTC with little relief.  LOV with Dr. Ulises Mayorga 3/15/19 , transferred to [facility] to schedule annual exam    Unable to pend medication  Routing to

## 2021-05-14 ENCOUNTER — HOSPITAL ENCOUNTER (OUTPATIENT)
Age: 61
Discharge: HOME | End: 2021-05-14
Attending: PHYSICIAN ASSISTANT
Payer: COMMERCIAL

## 2021-05-14 VITALS
RESPIRATION RATE: 20 BRPM | SYSTOLIC BLOOD PRESSURE: 146 MMHG | HEART RATE: 87 BPM | TEMPERATURE: 98 F | OXYGEN SATURATION: 98 % | DIASTOLIC BLOOD PRESSURE: 89 MMHG

## 2021-05-14 DIAGNOSIS — R03.0 ELEVATED BLOOD PRESSURE READING: ICD-10-CM

## 2021-05-14 DIAGNOSIS — H10.9 CONJUNCTIVITIS OF LEFT EYE, UNSPECIFIED CONJUNCTIVITIS TYPE: Primary | ICD-10-CM

## 2021-05-14 PROCEDURE — 99213 OFFICE O/P EST LOW 20 MIN: CPT

## 2021-05-14 RX ORDER — POLYMYXIN B SULFATE AND TRIMETHOPRIM 1; 10000 MG/ML; [USP'U]/ML
1 SOLUTION OPHTHALMIC
Qty: 10 ML | Refills: 0 | Status: SHIPPED | OUTPATIENT
Start: 2021-05-14 | End: 2021-05-21

## 2021-05-15 NOTE — ED PROVIDER NOTES
Patient Seen in: [facility]    History   Patient presents with: Eye Visual Problem    Stated Complaint: Eye problem    HPI    78-year-old female presents with chief complaint of left eye redness. Onset this morning.   Patient denies sick conta Mother    • Glaucoma Neg    • Macular degeneration Neg        Social History    Tobacco Use      Smoking status: Never Smoker      Smokeless tobacco: Never Used    Alcohol use: No      Alcohol/week: 0.0 standard drinks    Drug use: No      Review of System No peritoneal signs. Genitourinary: Not examined. Lymphatic: No gross lymphadenopathy noted. Musculoskeletal: Musculoskeletal system is grossly intact. There is no obvious deformity. Neurological: Gross motor movement is intact in all 4 extremities. [address]  [facility] [address]  [phone]    Schedule an appointment as soon as possible for a visit in 2 days  For follow-up regarding elevated blood pressure reading      Medications Prescribed:  Current Discharge Medication List    START taking these medic

## 2021-07-01 ENCOUNTER — OFFICE VISIT (OUTPATIENT)
Dept: OPHTHALMOLOGY | Facility: CLINIC | Age: 61
End: 2021-07-01
Payer: COMMERCIAL

## 2021-07-01 DIAGNOSIS — H40.1122 PRIMARY OPEN ANGLE GLAUCOMA (POAG) OF LEFT EYE, MODERATE STAGE: Primary | ICD-10-CM

## 2021-07-01 PROCEDURE — 99213 OFFICE O/P EST LOW 20 MIN: CPT | Performed by: OPHTHALMOLOGY

## 2021-07-01 NOTE — PROGRESS NOTES
Wauneta Goodpasture is a 61year old female. HPI:     HPI     Patient is here for an IOP check. She is taking Latanoprost OS QAM as directed.       Last edited by Deacon Isidro OT on 7/1/2021 11:09 AM. (History)        Patient History:  Past Medical Histor Right Left    Pressure 13 13          Pachymetry (10/23/2018)       Right Left    Thickness 552/-1 542/+0          Pupils       Pupils    Right PERRL    Left PERRL            Slit Lamp and Fundus Exam     Slit Lamp Exam       Right Left    Lids/Lashes Meib

## 2021-07-01 NOTE — ASSESSMENT & PLAN NOTE
IOP is stable. Continue Latanoprost once a day in the left eye- okay to use it in the morning if that works better for her.     Will have patient back in 4 months for a visual field, OCT and dilated eye exam.

## 2021-07-01 NOTE — PATIENT INSTRUCTIONS
Primary open angle glaucoma (POAG) of left eye, moderate stage  IOP is stable. Continue Latanoprost once a day in the left eye- okay to use it in the morning if that works better for her.     Will have patient back in 4 months for a visual field, OCT and

## 2021-07-19 ENCOUNTER — ORDER TRANSCRIPTION (OUTPATIENT)
Dept: ADMINISTRATIVE | Facility: HOSPITAL | Age: 61
End: 2021-07-19

## 2021-07-19 DIAGNOSIS — Z12.31 ENCOUNTER FOR SCREENING MAMMOGRAM FOR MALIGNANT NEOPLASM OF BREAST: Primary | ICD-10-CM

## 2021-07-21 ENCOUNTER — HOSPITAL ENCOUNTER (OUTPATIENT)
Dept: MAMMOGRAPHY | Facility: HOSPITAL | Age: 61
Discharge: HOME | End: 2021-07-21
Attending: INTERNAL MEDICINE
Payer: COMMERCIAL

## 2021-07-21 DIAGNOSIS — Z12.31 ENCOUNTER FOR SCREENING MAMMOGRAM FOR MALIGNANT NEOPLASM OF BREAST: ICD-10-CM

## 2021-07-21 PROCEDURE — 77063 BREAST TOMOSYNTHESIS BI: CPT | Performed by: INTERNAL MEDICINE

## 2021-07-21 PROCEDURE — 77067 SCR MAMMO BI INCL CAD: CPT | Performed by: INTERNAL MEDICINE

## 2021-07-23 ENCOUNTER — OFFICE VISIT (OUTPATIENT)
Dept: INTERNAL MEDICINE CLINIC | Facility: CLINIC | Age: 61
End: 2021-07-23
Payer: COMMERCIAL

## 2021-07-23 VITALS
HEIGHT: 66 IN | RESPIRATION RATE: 16 BRPM | HEART RATE: 88 BPM | BODY MASS INDEX: 30.47 KG/M2 | WEIGHT: 189.63 LBS | SYSTOLIC BLOOD PRESSURE: 123 MMHG | DIASTOLIC BLOOD PRESSURE: 80 MMHG

## 2021-07-23 DIAGNOSIS — Z00.00 PHYSICAL EXAM, ANNUAL: Primary | ICD-10-CM

## 2021-07-23 DIAGNOSIS — Z12.11 COLON CANCER SCREENING: ICD-10-CM

## 2021-07-23 DIAGNOSIS — R92.2 DENSE BREAST TISSUE ON MAMMOGRAM: ICD-10-CM

## 2021-07-23 DIAGNOSIS — E55.9 VITAMIN D DEFICIENCY: ICD-10-CM

## 2021-07-23 PROCEDURE — 99396 PREV VISIT EST AGE 40-64: CPT | Performed by: INTERNAL MEDICINE

## 2021-07-23 PROCEDURE — 3079F DIAST BP 80-89 MM HG: CPT | Performed by: INTERNAL MEDICINE

## 2021-07-23 PROCEDURE — 3008F BODY MASS INDEX DOCD: CPT | Performed by: INTERNAL MEDICINE

## 2021-07-23 PROCEDURE — 3074F SYST BP LT 130 MM HG: CPT | Performed by: INTERNAL MEDICINE

## 2021-07-23 RX ORDER — LORATADINE AND PSEUDOEPHEDRINE SULFATE 10; 240 MG/1; MG/1
1 TABLET, EXTENDED RELEASE ORAL DAILY
Qty: 30 TABLET | Refills: 5 | Status: SHIPPED | OUTPATIENT
Start: 2021-07-23

## 2021-07-24 PROBLEM — F41.9 ANXIETY: Status: RESOLVED | Noted: 2017-12-20 | Resolved: 2021-07-24

## 2021-07-24 PROBLEM — J02.9 PHARYNGITIS: Status: RESOLVED | Noted: 2017-03-07 | Resolved: 2021-07-24

## 2021-07-24 PROBLEM — Z56.6 STRESS AT WORK: Status: RESOLVED | Noted: 2017-12-20 | Resolved: 2021-07-24

## 2021-07-24 PROBLEM — M25.552 LEFT HIP PAIN: Status: RESOLVED | Noted: 2017-01-26 | Resolved: 2021-07-24

## 2021-07-24 NOTE — PROGRESS NOTES
HPI/Subjective:   Patient ID: Jasmin Schwartz is a 61year old female. Presents for a physical exam    HPI  Patient states that she has been feeling well overall, she retired, denies chest pain shortness of breath or headaches, she has no back pain.   She n is not in acute distress. Appearance: Normal appearance. HENT:      Head: Normocephalic and atraumatic.       Right Ear: Tympanic membrane and ear canal normal.      Left Ear: Tympanic membrane and ear canal normal.   Eyes:      General: No scleral ic Tablet 24 Hr 30 tablet 5     Sig: Take 1 tablet by mouth daily.      Follow-up as needed  Imaging & Referrals:  GASTRO - INTERNAL  US BREAST RIGHT COMPLETE ([id number])  US BREAST LEFT COMPLETE ([id number])

## 2021-07-30 ENCOUNTER — LAB ENCOUNTER (OUTPATIENT)
Dept: LAB | Facility: HOSPITAL | Age: 61
End: 2021-07-30
Attending: INTERNAL MEDICINE
Payer: COMMERCIAL

## 2021-07-30 LAB
ALBUMIN SERPL-MCNC: 3.8 G/DL (ref 3.4–5)
ALBUMIN/GLOB SERPL: 1 {RATIO} (ref 1–2)
ALP LIVER SERPL-CCNC: 102 U/L
ALT SERPL-CCNC: 20 U/L
ANION GAP SERPL CALC-SCNC: 6 MMOL/L (ref 0–18)
AST SERPL-CCNC: 19 U/L (ref 15–37)
BASOPHILS # BLD AUTO: 0.01 X10(3) UL (ref 0–0.2)
BASOPHILS NFR BLD AUTO: 0.2 %
BILIRUB SERPL-MCNC: 0.7 MG/DL (ref 0.1–2)
BUN BLD-MCNC: 8 MG/DL (ref 7–18)
BUN/CREAT SERPL: 11.4 (ref 10–20)
CALCIUM BLD-MCNC: 9.6 MG/DL (ref 8.5–10.1)
CHLORIDE SERPL-SCNC: 110 MMOL/L (ref 98–112)
CHOLEST SMN-MCNC: 233 MG/DL (ref ?–200)
CO2 SERPL-SCNC: 26 MMOL/L (ref 21–32)
CREAT BLD-MCNC: 0.7 MG/DL
DEPRECATED RDW RBC AUTO: 45.6 FL (ref 35.1–46.3)
EOSINOPHIL # BLD AUTO: 0.09 X10(3) UL (ref 0–0.7)
EOSINOPHIL NFR BLD AUTO: 1.6 %
ERYTHROCYTE [DISTWIDTH] IN BLOOD BY AUTOMATED COUNT: 14.7 % (ref 11–15)
GLOBULIN PLAS-MCNC: 3.8 G/DL (ref 2.8–4.4)
GLUCOSE BLD-MCNC: 82 MG/DL (ref 70–99)
HCT VFR BLD AUTO: 37.4 %
HDLC SERPL-MCNC: 57 MG/DL (ref 40–59)
HGB BLD-MCNC: 12 G/DL
IMM GRANULOCYTES # BLD AUTO: 0.01 X10(3) UL (ref 0–1)
IMM GRANULOCYTES NFR BLD: 0.2 %
LDLC SERPL CALC-MCNC: 161 MG/DL (ref ?–100)
LYMPHOCYTES # BLD AUTO: 2.82 X10(3) UL (ref 1–4)
LYMPHOCYTES NFR BLD AUTO: 49.5 %
M PROTEIN MFR SERPL ELPH: 7.6 G/DL (ref 6.4–8.2)
MCH RBC QN AUTO: 27.1 PG (ref 26–34)
MCHC RBC AUTO-ENTMCNC: 32.1 G/DL (ref 31–37)
MCV RBC AUTO: 84.4 FL
MONOCYTES # BLD AUTO: 0.5 X10(3) UL (ref 0.1–1)
MONOCYTES NFR BLD AUTO: 8.8 %
NEUTROPHILS # BLD AUTO: 2.27 X10 (3) UL (ref 1.5–7.7)
NEUTROPHILS # BLD AUTO: 2.27 X10(3) UL (ref 1.5–7.7)
NEUTROPHILS NFR BLD AUTO: 39.7 %
NONHDLC SERPL-MCNC: 176 MG/DL (ref ?–130)
OSMOLALITY SERPL CALC.SUM OF ELEC: 291 MOSM/KG (ref 275–295)
PATIENT FASTING Y/N/NP: YES
PATIENT FASTING Y/N/NP: YES
PLATELET # BLD AUTO: 196 10(3)UL (ref 150–450)
POTASSIUM SERPL-SCNC: 3.5 MMOL/L (ref 3.5–5.1)
RBC # BLD AUTO: 4.43 X10(6)UL
SODIUM SERPL-SCNC: 142 MMOL/L (ref 136–145)
TRIGL SERPL-MCNC: 85 MG/DL (ref 30–149)
TSI SER-ACNC: 0.67 MIU/ML (ref 0.36–3.74)
VLDLC SERPL CALC-MCNC: 16 MG/DL (ref 0–30)
WBC # BLD AUTO: 5.7 X10(3) UL (ref 4–11)

## 2021-07-30 PROCEDURE — 80053 COMPREHEN METABOLIC PANEL: CPT | Performed by: INTERNAL MEDICINE

## 2021-07-30 PROCEDURE — 85025 COMPLETE CBC W/AUTO DIFF WBC: CPT | Performed by: INTERNAL MEDICINE

## 2021-07-30 PROCEDURE — 82306 VITAMIN D 25 HYDROXY: CPT | Performed by: INTERNAL MEDICINE

## 2021-07-30 PROCEDURE — 36415 COLL VENOUS BLD VENIPUNCTURE: CPT | Performed by: INTERNAL MEDICINE

## 2021-07-30 PROCEDURE — 80061 LIPID PANEL: CPT | Performed by: INTERNAL MEDICINE

## 2021-07-30 PROCEDURE — 84443 ASSAY THYROID STIM HORMONE: CPT | Performed by: INTERNAL MEDICINE

## 2021-08-02 LAB — 25(OH)D3 SERPL-MCNC: 27.3 NG/ML (ref 30–100)

## 2021-08-31 RX ORDER — LATANOPROST 50 UG/ML
SOLUTION/ DROPS OPHTHALMIC
Qty: 7.5 ML | Refills: 3 | Status: SHIPPED | OUTPATIENT
Start: 2021-08-31

## 2021-08-31 NOTE — TELEPHONE ENCOUNTER
Patient returning call states:     [facility]. [facility] [place] on [address], [address] [address]  2/3. States she is still using them but will need another prescription and refills sent over. latanoprost 0.005 %   Please advise.

## 2021-08-31 NOTE — TELEPHONE ENCOUNTER
[facility] Fax received that pt has not been picking up her eye drop refills. I called patient and LM for her to call us back. 1. Verify Pharmacy  2. Ask pt if she is using Latanoprost as directed ( 1 drop in the left eye at bedtime)  3.  Ask if she nee

## 2021-10-25 ENCOUNTER — TELEPHONE (OUTPATIENT)
Dept: INTERNAL MEDICINE CLINIC | Facility: CLINIC | Age: 61
End: 2021-10-25

## 2021-10-25 DIAGNOSIS — H02.713: Primary | ICD-10-CM

## 2021-10-25 DIAGNOSIS — H02.716: Primary | ICD-10-CM

## 2021-10-26 ENCOUNTER — TELEPHONE (OUTPATIENT)
Dept: DERMATOLOGY CLINIC | Facility: CLINIC | Age: 61
End: 2021-10-26

## 2021-10-26 NOTE — TELEPHONE ENCOUNTER
Dr. Ted Seth,    Please see message below and sign off on referral if you agree. Thank you, Kedar Pena Specialist    Managed Care.

## 2021-10-26 NOTE — TELEPHONE ENCOUNTER
Spoke to patient, she has hyperpigmentation and dry area under her eyes due to allergies, states that in the past was prescribed mometasone fumarate which was helpful usually takes care of the problem, wants refill, she has appointment with dermatology Dr. Leeanna Servin in January referral approved, prescription sent to the pharmacy, she has not used this ointment in about 18 months

## 2021-10-26 NOTE — TELEPHONE ENCOUNTER
LOV 12/2017 - appt made for Jan 2022. Pt uses this for dark patches under eyes. Will ask  PCP if needed prior to being seen.

## 2021-11-09 ENCOUNTER — TELEPHONE (OUTPATIENT)
Dept: OPHTHALMOLOGY | Facility: CLINIC | Age: 61
End: 2021-11-09

## 2022-02-09 ENCOUNTER — TELEPHONE (OUTPATIENT)
Dept: OPHTHALMOLOGY | Facility: CLINIC | Age: 62
End: 2022-02-09

## 2022-03-08 ENCOUNTER — OFFICE VISIT (OUTPATIENT)
Dept: OPHTHALMOLOGY | Facility: CLINIC | Age: 62
End: 2022-03-08
Payer: COMMERCIAL

## 2022-03-08 DIAGNOSIS — H25.13 AGE-RELATED NUCLEAR CATARACT OF BOTH EYES: ICD-10-CM

## 2022-03-08 DIAGNOSIS — H40.1122 PRIMARY OPEN ANGLE GLAUCOMA (POAG) OF LEFT EYE, MODERATE STAGE: Primary | ICD-10-CM

## 2022-03-08 PROCEDURE — 92133 CPTRZD OPH DX IMG PST SGM ON: CPT | Performed by: OPHTHALMOLOGY

## 2022-03-08 PROCEDURE — 92083 EXTENDED VISUAL FIELD XM: CPT | Performed by: OPHTHALMOLOGY

## 2022-03-08 PROCEDURE — 92014 COMPRE OPH EXAM EST PT 1/>: CPT | Performed by: OPHTHALMOLOGY

## 2022-03-08 NOTE — ASSESSMENT & PLAN NOTE
Discussed mild cataracts in both eyes that are not affecting vision and are not surgical at this time. No change in glasses.

## 2022-03-08 NOTE — ASSESSMENT & PLAN NOTE
Visual field and OCT completed in office today with stable results that were discussed with patient in office today. IOP is stable. Continue Latanoprost once every morning in the left eye as directed. Will have patient back in 4 months for a pressure check.

## 2022-03-08 NOTE — PATIENT INSTRUCTIONS
Primary open angle glaucoma (POAG) of left eye, moderate stage  Visual field and OCT completed in office today with stable results that were discussed with patient in office today. IOP is stable. Continue Latanoprost once every morning in the left eye as directed. Will have patient back in 4 months for a pressure check. Age-related nuclear cataract of both eyes  Discussed mild cataracts in both eyes that are not affecting vision and are not surgical at this time. No change in glasses.

## 2022-05-31 ENCOUNTER — TELEPHONE (OUTPATIENT)
Dept: OPHTHALMOLOGY | Facility: CLINIC | Age: 62
End: 2022-05-31

## 2022-06-01 ENCOUNTER — OFFICE VISIT (OUTPATIENT)
Dept: OPHTHALMOLOGY | Facility: CLINIC | Age: 62
End: 2022-06-01
Payer: COMMERCIAL

## 2022-06-01 DIAGNOSIS — H11.32 SUBCONJUNCTIVAL HEMORRHAGE OF LEFT EYE: Primary | ICD-10-CM

## 2022-06-01 PROCEDURE — 99213 OFFICE O/P EST LOW 20 MIN: CPT | Performed by: OPHTHALMOLOGY

## 2022-06-01 NOTE — TELEPHONE ENCOUNTER
Left eye is very red, bloodshot all around the eye. She woke up like that yesterday. She denies any pain, blurry vision or photophobia. She denies any injury to the eye and she is not on any blood thinner. She says it may have subsided slightly. Patient would like an appointment. Scheduled her at 2:15 pm today.

## 2022-06-01 NOTE — ASSESSMENT & PLAN NOTE
Discussed diagnosis with patient. No treatment is needed at this time. Patient was reassured that there is no scratch, infection, foreign body or inflammation in the eye. Told  patient that this may take up to  1-2 weeks to resolve. Patient denies any bruising or bleeding disorder. Patient instructed that they can use over the counter artificial tears as needed for ocular comfort while this is resolving. Subconjunctival hemmorhage information  given.

## 2022-06-01 NOTE — PATIENT INSTRUCTIONS
Subconjunctival hemorrhage of left eye  Discussed diagnosis with patient. No treatment is needed at this time. Patient was reassured that there is no scratch, infection, foreign body or inflammation in the eye. Told  patient that this may take up to  1-2 weeks to resolve. Patient denies any bruising or bleeding disorder. Patient instructed that they can use over the counter artificial tears as needed for ocular comfort while this is resolving. Subconjunctival hemmorhage information  given.

## 2022-07-13 ENCOUNTER — OFFICE VISIT (OUTPATIENT)
Dept: OPHTHALMOLOGY | Facility: CLINIC | Age: 62
End: 2022-07-13
Payer: COMMERCIAL

## 2022-07-13 DIAGNOSIS — H25.13 AGE-RELATED NUCLEAR CATARACT OF BOTH EYES: ICD-10-CM

## 2022-07-13 DIAGNOSIS — H40.1122 PRIMARY OPEN ANGLE GLAUCOMA (POAG) OF LEFT EYE, MODERATE STAGE: Primary | ICD-10-CM

## 2022-07-13 PROCEDURE — 99213 OFFICE O/P EST LOW 20 MIN: CPT | Performed by: OPHTHALMOLOGY

## 2022-07-13 RX ORDER — LATANOPROST 50 UG/ML
SOLUTION/ DROPS OPHTHALMIC
Qty: 7.5 ML | Refills: 3 | Status: SHIPPED | OUTPATIENT
Start: 2022-07-13

## 2022-07-13 NOTE — PATIENT INSTRUCTIONS
Primary open angle glaucoma (POAG) of left eye, moderate stage  IOP is stable. Continue Latanoprost once every morning in the left eye as directed. Age-related nuclear cataract of both eyes  New glasses Rx given. Update as needed.

## 2022-08-24 ENCOUNTER — NURSE TRIAGE (OUTPATIENT)
Dept: INTERNAL MEDICINE CLINIC | Facility: CLINIC | Age: 62
End: 2022-08-24

## 2022-09-22 ENCOUNTER — TELEPHONE (OUTPATIENT)
Dept: FAMILY MEDICINE CLINIC | Facility: CLINIC | Age: 62
End: 2022-09-22

## 2022-09-22 DIAGNOSIS — Z12.31 BREAST CANCER SCREENING BY MAMMOGRAM: Primary | ICD-10-CM

## 2022-11-30 ENCOUNTER — TELEPHONE (OUTPATIENT)
Dept: OPHTHALMOLOGY | Facility: CLINIC | Age: 62
End: 2022-11-30

## 2022-12-13 ENCOUNTER — HOSPITAL ENCOUNTER (OUTPATIENT)
Dept: MAMMOGRAPHY | Facility: HOSPITAL | Age: 62
Discharge: HOME | End: 2022-12-13
Attending: INTERNAL MEDICINE
Payer: COMMERCIAL

## 2022-12-13 DIAGNOSIS — Z12.31 BREAST CANCER SCREENING BY MAMMOGRAM: ICD-10-CM

## 2022-12-13 PROCEDURE — 77067 SCR MAMMO BI INCL CAD: CPT | Performed by: INTERNAL MEDICINE

## 2022-12-13 PROCEDURE — 77063 BREAST TOMOSYNTHESIS BI: CPT | Performed by: INTERNAL MEDICINE

## 2022-12-14 ENCOUNTER — OFFICE VISIT (OUTPATIENT)
Dept: OPHTHALMOLOGY | Facility: CLINIC | Age: 62
End: 2022-12-14
Payer: COMMERCIAL

## 2022-12-14 DIAGNOSIS — H40.1122 PRIMARY OPEN ANGLE GLAUCOMA (POAG) OF LEFT EYE, MODERATE STAGE: Primary | ICD-10-CM

## 2022-12-14 PROCEDURE — 99213 OFFICE O/P EST LOW 20 MIN: CPT | Performed by: OPHTHALMOLOGY

## 2022-12-14 NOTE — ASSESSMENT & PLAN NOTE
IOP is stable. Continue Latanoprost once every morning in the left eye as directed. Will have patient back in 4 months for a visual field, optic nerve scan, dilated eye exam and photos.

## 2022-12-14 NOTE — PATIENT INSTRUCTIONS
Primary open angle glaucoma (POAG) of left eye, moderate stage  IOP is stable. Continue Latanoprost once every morning in the left eye as directed. Will have patient back in 4 months for a visual field, optic nerve scan, dilated eye exam and photos.

## 2023-04-25 ENCOUNTER — OFFICE VISIT (OUTPATIENT)
Dept: OPHTHALMOLOGY | Facility: CLINIC | Age: 63
End: 2023-04-25

## 2023-04-25 DIAGNOSIS — H40.1122 PRIMARY OPEN ANGLE GLAUCOMA (POAG) OF LEFT EYE, MODERATE STAGE: Primary | ICD-10-CM

## 2023-04-25 DIAGNOSIS — H25.13 AGE-RELATED NUCLEAR CATARACT OF BOTH EYES: ICD-10-CM

## 2023-04-25 PROCEDURE — 92083 EXTENDED VISUAL FIELD XM: CPT | Performed by: OPHTHALMOLOGY

## 2023-04-25 PROCEDURE — 92250 FUNDUS PHOTOGRAPHY W/I&R: CPT | Performed by: OPHTHALMOLOGY

## 2023-04-25 PROCEDURE — 92133 CPTRZD OPH DX IMG PST SGM ON: CPT | Performed by: OPHTHALMOLOGY

## 2023-04-25 PROCEDURE — 92014 COMPRE OPH EXAM EST PT 1/>: CPT | Performed by: OPHTHALMOLOGY

## 2023-04-25 RX ORDER — LATANOPROST 50 UG/ML
SOLUTION/ DROPS OPHTHALMIC
Qty: 7.5 ML | Refills: 3 | Status: SHIPPED | OUTPATIENT
Start: 2023-04-25

## 2023-04-25 NOTE — ASSESSMENT & PLAN NOTE
Discussed mild cataracts in both eyes that are not affecting vision and are not surgical at this time. Continue with same glasses.

## 2023-04-25 NOTE — ASSESSMENT & PLAN NOTE
Visual field and OCT completed in office today with stable results that were discussed with patient in office today. IOP is stable. Continue Latanoprost once every morning in the left eye as directed. Will have patient back in 4 months for a pressure check. Photos taken today to document optic nerves.

## 2023-04-25 NOTE — PATIENT INSTRUCTIONS
Age-related nuclear cataract of both eyes  Discussed mild cataracts in both eyes that are not affecting vision and are not surgical at this time. Continue with same glasses. Primary open angle glaucoma (POAG) of left eye, moderate stage  Visual field and OCT completed in office today with stable results that were discussed with patient in office today. IOP is stable. Continue Latanoprost once every morning in the left eye as directed. Will have patient back in 4 months for a pressure check. Photos taken today to document optic nerves.

## 2023-08-30 ENCOUNTER — OFFICE VISIT (OUTPATIENT)
Dept: OPHTHALMOLOGY | Facility: CLINIC | Age: 63
End: 2023-08-30

## 2023-08-30 DIAGNOSIS — H40.1122 PRIMARY OPEN ANGLE GLAUCOMA (POAG) OF LEFT EYE, MODERATE STAGE: Primary | ICD-10-CM

## 2023-08-30 PROCEDURE — 99213 OFFICE O/P EST LOW 20 MIN: CPT | Performed by: OPHTHALMOLOGY

## 2024-01-04 ENCOUNTER — OFFICE VISIT (OUTPATIENT)
Dept: OPHTHALMOLOGY | Facility: CLINIC | Age: 64
End: 2024-01-04

## 2024-01-04 DIAGNOSIS — H40.1122 PRIMARY OPEN ANGLE GLAUCOMA (POAG) OF LEFT EYE, MODERATE STAGE: Primary | ICD-10-CM

## 2024-01-04 PROCEDURE — 99213 OFFICE O/P EST LOW 20 MIN: CPT | Performed by: OPHTHALMOLOGY

## 2024-01-04 RX ORDER — LATANOPROST 50 UG/ML
SOLUTION/ DROPS OPHTHALMIC
Qty: 7.5 ML | Refills: 3 | Status: CANCELLED | OUTPATIENT
Start: 2024-01-04

## 2024-01-04 NOTE — ASSESSMENT & PLAN NOTE
IOP is stable.   Continue Latanoprost once every morning in the left eye as directed.      Will have patient back in 4 months for a visual field, optic nerve scan and dilated eye exam

## 2024-01-04 NOTE — PROGRESS NOTES
Mely Kendrick is a 63 year old female.    HPI:     HPI    Pt is here for an IOP ck.  Pt is taking Latanoprost every morning in the left eye only.  Pt denies vision changes.      Last edited by Cecy Bynum O.T. on 1/4/2024 10:23 AM.        Patient History:  Past Medical History:   Diagnosis Date    Back pain 2017    arthritis    Ectopic pregnancy     Glaucoma, left eye 10/23/2018    DX of glaucoma in the left eye by INDIRA based on VF and OCT results- START Latanoprost qhs OS    Lipid screening 8/1/2013    per VARSHA Caceres 2014       Surgical History: Mely Kendrick has no past surgical history on file.    Family History   Problem Relation Age of Onset    Stroke Father         CVA    Diabetes Sister     Diabetes Brother     Diabetes Brother     Hypertension Brother     Asthma Mother     Hypertension Mother     Glaucoma Neg     Macular degeneration Neg        Social History:   Social History     Socioeconomic History    Marital status:    Tobacco Use    Smoking status: Never    Smokeless tobacco: Never   Substance and Sexual Activity    Alcohol use: No     Alcohol/week: 0.0 standard drinks of alcohol    Drug use: No    Sexual activity: Not Currently   Other Topics Concern    Caffeine Concern No    Exercise No    Pt has a pacemaker No    Pt has a defibrillator No    Reaction to local anesthetic No   Social History Narrative    The patient does not use an assistive device..      The patient does live in a home with stairs.       Medications:  Current Outpatient Medications   Medication Sig Dispense Refill    latanoprost 0.005 % Ophthalmic Solution Instill 1 drop by ophthalmic route every night into left eye 90 day supply (Patient taking differently: daily. Instill 1 drop by ophthalmic route every morning into left eye 90 day supply) 7.5 mL 3    mometasone 0.1 % External Ointment Apply to the area on the face daily not more than 10 days 15 g 0    Loratadine-Pseudoephedrine ER (CLARITIN-D 24 HOUR)  MG  Oral Tablet 24 Hr Take 1 tablet by mouth daily. 30 tablet 5    IBUPROFEN 600 MG Oral Tab TAKE 1 TABLET(600 MG) BY MOUTH EVERY 8 HOURS AS NEEDED FOR PAIN 90 tablet 0    Cyclobenzaprine HCl 10 MG Oral Tab Take 1 tablet (10 mg total) by mouth nightly. 90 tablet 0       Allergies:  Allergies   Allergen Reactions    Sulfur ITCHING       ROS:       PHYSICAL EXAM:     Base Eye Exam       Visual Acuity (Snellen - Linear)         Right Left    Dist cc 20/20 20/20      Correction: Glasses   Checked VA with phoropter.             Tonometry (Applanation, 10:31 AM)         Right Left    Pressure 10 10              Pachymetry (10/23/2018)         Right Left    Thickness 552/-1 542/+0              Pupils         Pupils    Right PERRL    Left PERRL                  Slit Lamp and Fundus Exam       Slit Lamp Exam         Right Left    Lids/Lashes Meibomian gland dysfunction Meibomian gland dysfunction    Conjunctiva/Sclera Ocular Melanosis, Temp pinguecula Ocular Melanosis    Cornea Clear Clear    Anterior Chamber Deep and quiet Deep and quiet    Iris Normal Normal              Fundus Exam         Right Left    Disc Sloping margin, Temporal crescent Sloping margin, Temporal crescent    C/D Ratio 0.7 0.7                  Refraction       Wearing Rx         Sphere Cylinder Axis    Right -0.75 +0.50 010    Left -1.00 +0.50 015      Type: Forgot Distance only              Wearing Rx #2         Sphere Cylinder Axis    Right +1.00 Sphere     Left +1.00 Sphere       Type: Forgot OTC reading only                     ASSESSMENT/PLAN:     Diagnoses and Plan:     Primary open angle glaucoma (POAG) of left eye, moderate stage  IOP is stable.   Continue Latanoprost once every morning in the left eye as directed.      Will have patient back in 4 months for a visual field, optic nerve scan and dilated eye exam    Orders Placed This Encounter   Procedures    OCT, Optic Nerve - OU - Both Eyes    Garcia Visual Field - OU - Both Eyes       Meds This  Visit:  Requested Prescriptions      No prescriptions requested or ordered in this encounter        Follow up instructions:  Return in about 4 months (around 5/4/2024) for Visual Field, OCT, complete exam.    1/4/2024  Scribed by: Munir Rose MD

## 2024-01-04 NOTE — PATIENT INSTRUCTIONS
Primary open angle glaucoma (POAG) of left eye, moderate stage  IOP is stable.   Continue Latanoprost once every morning in the left eye as directed.      Will have patient back in 4 months for a visual field, optic nerve scan and dilated eye exam

## 2024-05-24 ENCOUNTER — NURSE TRIAGE (OUTPATIENT)
Dept: INTERNAL MEDICINE CLINIC | Facility: CLINIC | Age: 64
End: 2024-05-24

## 2024-05-24 NOTE — TELEPHONE ENCOUNTER
Action Requested: Summary for Provider     []  Critical Lab, Recommendations Needed  [x] Need Additional Advice  []   FYI    []   Need Orders  [] Need Medications Sent to Pharmacy  []  Other     SUMMARY: daughter calling for Patient. Patient has not been seen since 2021.  Daughter noticed Patient has been feeling tired all the time and more forgetful.  Denies acute symptoms. Denies fever, urinary symptoms. Per daughter, Patient is retired and not doing much and wonders why she is tired all the time. Patient only eats small meals.  Daughter mentioned, brother passed away a few months ago.  Daughter requesting Patient to be seen by Dr. Lopez only within the next week.  No appointments available. Please advise if ok to use [facility] new Patient.  Daughter states she is also a Patient of Dr. Lopez.    Reason for call: Memory Loss (Tired all the time, forgetful gradually)  Onset: Data Unavailable                     Reason for Disposition   Fatigue (i.e., tires easily, decreased energy) and persists > 1 week    Protocols used: Weakness (Generalized) and Fatigue-A-OH

## 2024-05-24 NOTE — TELEPHONE ENCOUNTER
Only time I can see patient next week Wednesday 8:40 AM, can double book last appointment for 40 p.m., or Friday 8:20 AM.  Patient can see the other providers and [facility] who have openings next week.  Please see below

## 2024-06-11 ENCOUNTER — OFFICE VISIT (OUTPATIENT)
Dept: OPHTHALMOLOGY | Facility: CLINIC | Age: 64
End: 2024-06-11
Payer: COMMERCIAL

## 2024-06-11 DIAGNOSIS — H25.13 AGE-RELATED NUCLEAR CATARACT OF BOTH EYES: Primary | ICD-10-CM

## 2024-06-11 DIAGNOSIS — H40.1122 PRIMARY OPEN ANGLE GLAUCOMA (POAG) OF LEFT EYE, MODERATE STAGE: ICD-10-CM

## 2024-06-11 PROCEDURE — 92133 CPTRZD OPH DX IMG PST SGM ON: CPT | Performed by: OPHTHALMOLOGY

## 2024-06-11 PROCEDURE — 92083 EXTENDED VISUAL FIELD XM: CPT | Performed by: OPHTHALMOLOGY

## 2024-06-11 PROCEDURE — 92014 COMPRE OPH EXAM EST PT 1/>: CPT | Performed by: OPHTHALMOLOGY

## 2024-06-11 RX ORDER — LATANOPROST 50 UG/ML
1 SOLUTION/ DROPS OPHTHALMIC DAILY
Qty: 3 EACH | Refills: 3 | Status: SHIPPED | OUTPATIENT
Start: 2024-06-11

## 2024-06-11 NOTE — PROGRESS NOTES
Mely Kendrick is a 63 year old female.    HPI:     HPI    Pt here for a complete eye exam VF+OCT   States the vision seems to be stable, no changes noted.   Using Latanoprost eye drops only in the left eye every  morning.   Wears distance correction glasses that are 2 years old, and have OTC reading glasses that are helping well.     Last edited by Halley Walker OT on 6/11/2024 10:20 AM.        Patient History:  Past Medical History:    Back pain    arthritis    Ectopic pregnancy (HCC)    Glaucoma, left eye    DX of glaucoma in the left eye by INDIRA based on VF and OCT results- START Latanoprost qhs OS    Lipid screening    per VARSHA Caceres       Surgical History: Mely Kendrick has no past surgical history on file.    Family History   Problem Relation Age of Onset    Stroke Father         CVA    Diabetes Sister     Diabetes Brother     Diabetes Brother     Hypertension Brother     Asthma Mother     Hypertension Mother     Glaucoma Neg     Macular degeneration Neg        Social History:   Social History     Socioeconomic History    Marital status:    Tobacco Use    Smoking status: Never    Smokeless tobacco: Never   Substance and Sexual Activity    Alcohol use: No     Alcohol/week: 0.0 standard drinks of alcohol    Drug use: No    Sexual activity: Not Currently   Other Topics Concern    Caffeine Concern No    Exercise No    Pt has a pacemaker No    Pt has a defibrillator No    Reaction to local anesthetic No   Social History Narrative    The patient does not use an assistive device..      The patient does live in a home with stairs.       Medications:  Current Outpatient Medications   Medication Sig Dispense Refill    latanoprost 0.005 % Ophthalmic Solution Place 1 drop into the left eye daily. Instill 1 drop by ophthalmic route every morning into left eye 90 day supply 3 each 3    mometasone 0.1 % External Ointment Apply to the area on the face daily not more than 10 days 15 g 0     Loratadine-Pseudoephedrine ER (CLARITIN-D 24 HOUR)  MG Oral Tablet 24 Hr Take 1 tablet by mouth daily. 30 tablet 5    IBUPROFEN 600 MG Oral Tab TAKE 1 TABLET(600 MG) BY MOUTH EVERY 8 HOURS AS NEEDED FOR PAIN 90 tablet 0    Cyclobenzaprine HCl 10 MG Oral Tab Take 1 tablet (10 mg total) by mouth nightly. 90 tablet 0       Allergies:  Allergies   Allergen Reactions    Sulfur ITCHING       ROS:       PHYSICAL EXAM:     Base Eye Exam       Visual Acuity (Snellen - Linear)         Right Left    Dist cc 20/20 20/20    Near cc 20/20 20/20      Correction: Glasses   DVA with last glasses Rx in the phoropter  NVA with +2.50              Tonometry (Applanation, 10:29 AM)         Right Left    Pressure 14 12              Pachymetry (10/23/2018)         Right Left    Thickness 552/-1 542/+0              Pupils         Pupils    Right PERRL    Left PERRL              Visual Fields    Defer to HVF              Extraocular Movement         Right Left     Full Full              Dilation       Both eyes: 1.0% Mydriacyl and 2.5% Cole Synephrine @ 10:29 AM                  Slit Lamp and Fundus Exam       Slit Lamp Exam         Right Left    Lids/Lashes Meibomian gland dysfunction Meibomian gland dysfunction    Conjunctiva/Sclera Ocular Melanosis, Temp pinguecula Ocular Melanosis    Cornea Clear Clear    Anterior Chamber Deep and quiet Deep and quiet    Iris Normal Normal    Lens 1+ Nuclear sclerosis 1+ Nuclear sclerosis    Vitreous Clear Clear              Fundus Exam         Right Left    Disc Sloping margin, Temporal crescent Sloping margin, Temporal crescent    C/D Ratio 0.7 0.7    Macula Normal Normal    Vessels Normal Normal    Periphery Normal Normal                  Refraction       Wearing Rx         Sphere Cylinder Axis    Right -0.75 +0.50 010    Left -1.25 +0.75 015      Type: Forgot Distance only              Manifest Refraction    Declines refraction, states have vision insurance will get updated glasses elsewhere.                      ASSESSMENT/PLAN:     Diagnoses and Plan:     Primary open angle glaucoma (POAG) of left eye, moderate stage  Visual field and OCT completed in office today with stable results that were discussed with patient in office today.      IOP is stable.   Continue Latanoprost once every morning in the left eye as directed.    Will have patient back in 4 months for a pressure check.       Age-related nuclear cataract of both eyes  Discussed mild cataracts in both eyes that are not affecting vision and are not surgical at this time.      Continue with same glasses.     No orders of the defined types were placed in this encounter.      Meds This Visit:  Requested Prescriptions     Signed Prescriptions Disp Refills    latanoprost 0.005 % Ophthalmic Solution 3 each 3     Sig: Place 1 drop into the left eye daily. Instill 1 drop by ophthalmic route every morning into left eye 90 day supply        Follow up instructions:  Return in about 4 months (around 10/11/2024) for IOP check.    6/11/2024  Scribed by: Munir Rose MD

## 2024-06-11 NOTE — PATIENT INSTRUCTIONS
Primary open angle glaucoma (POAG) of left eye, moderate stage  Visual field and OCT completed in office today with stable results that were discussed with patient in office today.      IOP is stable.   Continue Latanoprost once every morning in the left eye as directed.    Will have patient back in 4 months for a pressure check.       Age-related nuclear cataract of both eyes  Discussed mild cataracts in both eyes that are not affecting vision and are not surgical at this time.      Continue with same glasses.

## 2024-06-18 ENCOUNTER — OFFICE VISIT (OUTPATIENT)
Dept: INTERNAL MEDICINE CLINIC | Facility: CLINIC | Age: 64
End: 2024-06-18

## 2024-06-18 ENCOUNTER — LAB ENCOUNTER (OUTPATIENT)
Dept: LAB | Age: 64
End: 2024-06-18
Attending: INTERNAL MEDICINE

## 2024-06-18 VITALS
RESPIRATION RATE: 18 BRPM | DIASTOLIC BLOOD PRESSURE: 79 MMHG | HEART RATE: 88 BPM | BODY MASS INDEX: 31 KG/M2 | WEIGHT: 194 LBS | SYSTOLIC BLOOD PRESSURE: 126 MMHG

## 2024-06-18 DIAGNOSIS — I83.813 VARICOSE VEINS OF BOTH LOWER EXTREMITIES WITH PAIN: ICD-10-CM

## 2024-06-18 DIAGNOSIS — Z12.31 SCREENING MAMMOGRAM FOR BREAST CANCER: ICD-10-CM

## 2024-06-18 DIAGNOSIS — Z00.00 PHYSICAL EXAM, ANNUAL: Primary | ICD-10-CM

## 2024-06-18 DIAGNOSIS — H61.21 EXCESSIVE CERUMEN IN RIGHT EAR CANAL: ICD-10-CM

## 2024-06-18 DIAGNOSIS — M25.552 PAIN OF LEFT HIP: ICD-10-CM

## 2024-06-18 DIAGNOSIS — R41.3 MEMORY IMPAIRMENT OF GRADUAL ONSET: ICD-10-CM

## 2024-06-18 DIAGNOSIS — Z12.11 COLON CANCER SCREENING: ICD-10-CM

## 2024-06-18 DIAGNOSIS — M54.50 LUMBAR PAIN: ICD-10-CM

## 2024-06-18 LAB
ALBUMIN SERPL-MCNC: 4.6 G/DL (ref 3.2–4.8)
ALBUMIN/GLOB SERPL: 1.5 {RATIO} (ref 1–2)
ALP LIVER SERPL-CCNC: 126 U/L
ALT SERPL-CCNC: 25 U/L
ANION GAP SERPL CALC-SCNC: 8 MMOL/L (ref 0–18)
AST SERPL-CCNC: 35 U/L (ref ?–34)
BASOPHILS # BLD AUTO: 0.01 X10(3) UL (ref 0–0.2)
BASOPHILS NFR BLD AUTO: 0.2 %
BILIRUB SERPL-MCNC: 0.7 MG/DL (ref 0.2–1.1)
BUN BLD-MCNC: 9 MG/DL (ref 9–23)
BUN/CREAT SERPL: 11.7 (ref 10–20)
CALCIUM BLD-MCNC: 9.5 MG/DL (ref 8.7–10.4)
CHLORIDE SERPL-SCNC: 109 MMOL/L (ref 98–112)
CO2 SERPL-SCNC: 28 MMOL/L (ref 21–32)
CREAT BLD-MCNC: 0.77 MG/DL
DEPRECATED RDW RBC AUTO: 44.3 FL (ref 35.1–46.3)
EGFRCR SERPLBLD CKD-EPI 2021: 87 ML/MIN/1.73M2 (ref 60–?)
EOSINOPHIL # BLD AUTO: 0.06 X10(3) UL (ref 0–0.7)
EOSINOPHIL NFR BLD AUTO: 1 %
ERYTHROCYTE [DISTWIDTH] IN BLOOD BY AUTOMATED COUNT: 14.3 % (ref 11–15)
FASTING STATUS PATIENT QL REPORTED: NO
GLOBULIN PLAS-MCNC: 3.1 G/DL (ref 2–3.5)
GLUCOSE BLD-MCNC: 89 MG/DL (ref 70–99)
HCT VFR BLD AUTO: 39.1 %
HGB BLD-MCNC: 12.8 G/DL
IMM GRANULOCYTES # BLD AUTO: 0.01 X10(3) UL (ref 0–1)
IMM GRANULOCYTES NFR BLD: 0.2 %
LYMPHOCYTES # BLD AUTO: 2.33 X10(3) UL (ref 1–4)
LYMPHOCYTES NFR BLD AUTO: 37.3 %
MCH RBC QN AUTO: 28.1 PG (ref 26–34)
MCHC RBC AUTO-ENTMCNC: 32.7 G/DL (ref 31–37)
MCV RBC AUTO: 85.9 FL
MONOCYTES # BLD AUTO: 0.44 X10(3) UL (ref 0.1–1)
MONOCYTES NFR BLD AUTO: 7.1 %
NEUTROPHILS # BLD AUTO: 3.39 X10 (3) UL (ref 1.5–7.7)
NEUTROPHILS # BLD AUTO: 3.39 X10(3) UL (ref 1.5–7.7)
NEUTROPHILS NFR BLD AUTO: 54.2 %
OSMOLALITY SERPL CALC.SUM OF ELEC: 298 MOSM/KG (ref 275–295)
PLATELET # BLD AUTO: 217 10(3)UL (ref 150–450)
POTASSIUM SERPL-SCNC: 4.1 MMOL/L (ref 3.5–5.1)
PROT SERPL-MCNC: 7.7 G/DL (ref 5.7–8.2)
RBC # BLD AUTO: 4.55 X10(6)UL
SODIUM SERPL-SCNC: 145 MMOL/L (ref 136–145)
T PALLIDUM AB SER QL IA: NONREACTIVE
TSI SER-ACNC: 0.99 MIU/ML (ref 0.55–4.78)
VIT B12 SERPL-MCNC: 867 PG/ML (ref 211–911)
WBC # BLD AUTO: 6.2 X10(3) UL (ref 4–11)

## 2024-06-18 PROCEDURE — 82607 VITAMIN B-12: CPT | Performed by: INTERNAL MEDICINE

## 2024-06-18 PROCEDURE — 99396 PREV VISIT EST AGE 40-64: CPT | Performed by: INTERNAL MEDICINE

## 2024-06-18 PROCEDURE — 80053 COMPREHEN METABOLIC PANEL: CPT | Performed by: INTERNAL MEDICINE

## 2024-06-18 PROCEDURE — 36415 COLL VENOUS BLD VENIPUNCTURE: CPT | Performed by: INTERNAL MEDICINE

## 2024-06-18 PROCEDURE — 85025 COMPLETE CBC W/AUTO DIFF WBC: CPT | Performed by: INTERNAL MEDICINE

## 2024-06-18 PROCEDURE — 84443 ASSAY THYROID STIM HORMONE: CPT | Performed by: INTERNAL MEDICINE

## 2024-06-18 PROCEDURE — 86780 TREPONEMA PALLIDUM: CPT | Performed by: INTERNAL MEDICINE

## 2024-06-20 ENCOUNTER — TELEPHONE (OUTPATIENT)
Dept: INTERNAL MEDICINE CLINIC | Facility: CLINIC | Age: 64
End: 2024-06-20

## 2024-06-23 NOTE — PROGRESS NOTES
Subjective:     Patient ID: Mely Kendrick is a 63 year old female.  Presents for physical exam    HPI  Patient is here accompanied by her daughter, daughter concerned that patient is more forgetful, several family members have dementia.  Patient is retired, she states that she is feeling well and she does not see any concerns.  She has not seen physician in about 3 years.  Complains of left hip pain aggravated by ambulation, at times pain radiates down the leg, no weakness in the legs    Review of Systems       Constitutional:  Negative for decreased activity, fever, irritability and lethargy  Cardiovascular:  Negative for chest pain and irregular heartbeat/palpitations  Respiratory:  Negative for cough, dyspnea and wheezing.  Eyes:  Negative for eye discharge and vision loss  Endocrine:  Negative for polydipsia and polyphagia  Integumentary:  Negative for pruritus and rash  Neurological:  Negative for gait disturbance, paresthesias.   Psychiatric:  Negative for inappropriate interaction and psychiatric symptoms  Current Outpatient Medications   Medication Sig Dispense Refill    latanoprost 0.005 % Ophthalmic Solution Place 1 drop into the left eye daily. Instill 1 drop by ophthalmic route every morning into left eye 90 day supply (Patient not taking: Reported on 6/18/2024) 3 each 3    mometasone 0.1 % External Ointment Apply to the area on the face daily not more than 10 days (Patient not taking: Reported on 6/18/2024) 15 g 0    Loratadine-Pseudoephedrine ER (CLARITIN-D 24 HOUR)  MG Oral Tablet 24 Hr Take 1 tablet by mouth daily. (Patient not taking: Reported on 6/18/2024) 30 tablet 5    IBUPROFEN 600 MG Oral Tab TAKE 1 TABLET(600 MG) BY MOUTH EVERY 8 HOURS AS NEEDED FOR PAIN (Patient not taking: Reported on 6/18/2024) 90 tablet 0    Cyclobenzaprine HCl 10 MG Oral Tab Take 1 tablet (10 mg total) by mouth nightly. (Patient not taking: Reported on 6/18/2024) 90 tablet 0     Allergies:  Allergies   Allergen  Reactions    Sulfur ITCHING       Past Medical History:    Back pain    arthritis    Ectopic pregnancy (HCC)    Glaucoma, left eye    DX of glaucoma in the left eye by INDIRA based on VF and OCT results- START Latanoprost qhs OS    Lipid screening    per VARSHA Caceres      History reviewed. No pertinent surgical history.   Family History   Problem Relation Age of Onset    Stroke Father         CVA    Diabetes Sister     Diabetes Brother     Diabetes Brother     Hypertension Brother     Asthma Mother     Hypertension Mother     Glaucoma Neg     Macular degeneration Neg       Social History:   Social History     Socioeconomic History    Marital status:    Tobacco Use    Smoking status: Never    Smokeless tobacco: Never   Vaping Use    Vaping status: Never Used   Substance and Sexual Activity    Alcohol use: No     Alcohol/week: 0.0 standard drinks of alcohol    Drug use: No    Sexual activity: Not Currently   Other Topics Concern    Caffeine Concern No    Exercise No    Pt has a pacemaker No    Pt has a defibrillator No    Reaction to local anesthetic No   Social History Narrative    The patient does not use an assistive device..      The patient does live in a home with stairs.        /79 (BP Location: Right arm, Patient Position: Sitting, Cuff Size: large)   Pulse 88   Resp 18   Wt 194 lb (88 kg)   BMI 31.31 kg/m²    Physical Exam  Constitutional:       Appearance: Normal appearance.   HENT:      Head: Normocephalic and atraumatic.      Right Ear: Tympanic membrane and ear canal normal. There is impacted cerumen.      Left Ear: Tympanic membrane and ear canal normal. There is no impacted cerumen.   Eyes:      General: No scleral icterus.     Extraocular Movements: Extraocular movements intact.      Conjunctiva/sclera: Conjunctivae normal.      Pupils: Pupils are equal, round, and reactive to light.   Neck:      Vascular: No carotid bruit.   Cardiovascular:      Rate and Rhythm: Normal rate and regular  rhythm.      Heart sounds: No murmur heard.     No gallop.   Pulmonary:      Effort: Pulmonary effort is normal. No respiratory distress.      Breath sounds: No wheezing or rhonchi.   Abdominal:      General: Bowel sounds are normal.      Palpations: Abdomen is soft. There is no mass.      Tenderness: There is no abdominal tenderness. There is no right CVA tenderness or left CVA tenderness.      Hernia: No hernia is present.   Musculoskeletal:         General: Normal range of motion.      Cervical back: Normal range of motion and neck supple.      Right lower leg: No edema.      Left lower leg: No edema.   Lymphadenopathy:      Cervical: No cervical adenopathy.   Skin:     General: Skin is warm.      Coloration: Skin is not jaundiced.      Comments: Prominent varicose veins below knee around ankles   Neurological:      General: No focal deficit present.      Mental Status: She is alert and oriented to person, place, and time. Mental status is at baseline.      Gait: Gait normal.      Deep Tendon Reflexes: Reflexes normal.   Psychiatric:         Mood and Affect: Mood normal.         Behavior: Behavior normal.         Thought Content: Thought content normal.         Assessment & Plan:   1. Physical exam, annual labs,   2. Memory impairment of gradual onset ordered CT scan of the brain with and without contrast patient would not be able to tolerate MRI see neurology see ENT specialist   3. Excessive cerumen in right ear canal    4. Lumbar pain get x-ray flexion-extension lumbar spine physical therapy ordered   5. Pain of left hip x-ray pelvis   6. Colon cancer screening see gastroenterology   7. Screening mammogram for breast cancer    8. Varicose veins of both lower extremities with pain see vascular surgery       Orders Placed This Encounter   Procedures    CBC With Differential With Platelet    Comp Metabolic Panel (14)    Vitamin B12    TSH W Reflex To Free T4    T Pallidum Screening Cascade [653][Q]     Discussed  with daughter that she should help patient to make arrangements for all necessary testing, and go with her follow-up appointments  Meds This Visit:  Requested Prescriptions      No prescriptions requested or ordered in this encounter       Imaging & Referrals:  NEURO - INTERNAL  ENT - INTERNAL  PHYSIATRY - INTERNAL  GASTRO - INTERNAL  VASCULAR SURGERY - INTERNAL  CT BRAIN (W+WO) (CPT=70470)  XR HIP W OR WO PELVIS 2 OR 3 VIEWS, LEFT (CPT=73502)  XR LUMBAR SPINE AP LAT FLEX EXT (CPT=72110)  [facility] VALENTINO 2D+3D SCREENING BILAT (CPT=77067/93866)     Follow-up in 3 months after all testing completed

## 2024-08-08 ENCOUNTER — HOSPITAL ENCOUNTER (OUTPATIENT)
Dept: CT IMAGING | Facility: HOSPITAL | Age: 64
Discharge: HOME | End: 2024-08-08
Attending: INTERNAL MEDICINE
Payer: COMMERCIAL

## 2024-08-08 ENCOUNTER — HOSPITAL ENCOUNTER (OUTPATIENT)
Dept: GENERAL RADIOLOGY | Facility: HOSPITAL | Age: 64
Discharge: HOME | End: 2024-08-08
Attending: INTERNAL MEDICINE
Payer: COMMERCIAL

## 2024-08-08 DIAGNOSIS — M25.552 PAIN OF LEFT HIP: ICD-10-CM

## 2024-08-08 DIAGNOSIS — R41.3 MEMORY IMPAIRMENT OF GRADUAL ONSET: ICD-10-CM

## 2024-08-08 DIAGNOSIS — M54.50 LUMBAR PAIN: ICD-10-CM

## 2024-08-08 LAB
CREAT BLD-MCNC: 0.8 MG/DL
EGFRCR SERPLBLD CKD-EPI 2021: 83 ML/MIN/1.73M2 (ref 60–?)

## 2024-08-08 PROCEDURE — 73502 X-RAY EXAM HIP UNI 2-3 VIEWS: CPT | Performed by: INTERNAL MEDICINE

## 2024-08-08 PROCEDURE — 82565 ASSAY OF CREATININE: CPT

## 2024-08-08 PROCEDURE — 72110 X-RAY EXAM L-2 SPINE 4/>VWS: CPT | Performed by: INTERNAL MEDICINE

## 2024-08-08 PROCEDURE — 70470 CT HEAD/BRAIN W/O & W/DYE: CPT | Performed by: INTERNAL MEDICINE

## 2024-08-12 ENCOUNTER — TELEPHONE (OUTPATIENT)
Dept: INTERNAL MEDICINE CLINIC | Facility: CLINIC | Age: 64
End: 2024-08-12

## 2024-08-12 DIAGNOSIS — M51.36 LUMBAR DEGENERATIVE DISC DISEASE: Primary | ICD-10-CM

## 2024-08-12 NOTE — TELEPHONE ENCOUNTER
Spoke with daughter Shaan Kendrick (on BRITTANY).  They are having trouble logging into [employer], requesting results of x-ray hip and CT brain.     Results and recommendations from Dr. Lopez provided.   Daughter verbalized understanding and will inform patient.     Contact information provided for [facility], neurology Dr. White, and physiatrist Dr. Sanchez.     Daughter stated she knows her mother would not want injections in her hip.     Daughter's contact added to chart, per most recent BRITTANY.

## 2024-08-12 NOTE — TELEPHONE ENCOUNTER
Left message for daughter  Shaan, matilde.  Asked her to call me back to review her mother's test results

## 2024-08-14 ENCOUNTER — TELEPHONE (OUTPATIENT)
Dept: INTERNAL MEDICINE CLINIC | Facility: CLINIC | Age: 64
End: 2024-08-14

## 2024-08-14 RX ORDER — MELOXICAM 15 MG/1
15 TABLET ORAL DAILY
Qty: 30 TABLET | Refills: 0 | Status: SHIPPED | OUTPATIENT
Start: 2024-08-14

## 2024-08-14 RX ORDER — FLUTICASONE PROPIONATE 50 MCG
2 SPRAY, SUSPENSION (ML) NASAL DAILY
Qty: 1 EACH | Refills: 3 | Status: SHIPPED | OUTPATIENT
Start: 2024-08-14

## 2024-09-13 RX ORDER — MELOXICAM 15 MG/1
15 TABLET ORAL DAILY
Qty: 30 TABLET | Refills: 0 | Status: SHIPPED | OUTPATIENT
Start: 2024-09-13

## 2024-09-13 NOTE — TELEPHONE ENCOUNTER
Please review. Protocol Pass    Original rx written 30 with no refills.  Rx is pended, is refill appropriate?     Requested Prescriptions   Pending Prescriptions Disp Refills    MELOXICAM 15 MG Oral Tab [Pharmacy Med Name: MELOXICAM 15 MG TABLET] 30 tablet 0     Sig: Take 1 tablet (15 mg total) by mouth daily.       Non-Narcotic Pain Medication Protocol Passed - 9/11/2024 12:25 AM        Passed - In person appointment or virtual visit in the past 6 mos or appointment in next 3 mos     Recent Outpatient Visits              2 months ago Physical exam, annual    [facility] [place], Ninel, MD    Office Visit    3 months ago [facility]    8 months ago Primary open angle glaucoma (POAG) of left eye, moderate stage    [facility]    [facility]    1 year ago Primary open angle glaucoma (POAG) of left eye, moderate stage    [facility]    [facility]    1 year ago Primary open angle glaucoma (POAG) of left eye, moderate stage    [facility], Munir Berg MD    Office Visit                               Recent Outpatient Visits              2 months ago Physical exam, annual    [facility], [place], Ninel, MD    Office Visit    3 months ago Age-related nuclear cataract of both eyes    [facility]    [facility]    8 months ago Primary open angle glaucoma (POAG) of left eye, moderate stage    [facility]    [facility]    1 year ago Primary open angle glaucoma (POAG) of left eye, moderate stage    [facility]  [place], Munir Berg MD    Office Visit    1 year ago Primary open angle glaucoma (POAG) of left eye, moderate stage    [facility] ([place] [place], Munir Berg MD    Office Visit

## 2024-10-22 RX ORDER — MELOXICAM 15 MG/1
15 TABLET ORAL DAILY
Qty: 90 TABLET | Refills: 0 | Status: SHIPPED | OUTPATIENT
Start: 2024-10-22

## 2024-10-22 NOTE — TELEPHONE ENCOUNTER
Refill passed per [facility] protocol.  Requested Prescriptions   Pending Prescriptions Disp Refills    MELOXICAM 15 MG Oral Tab [Pharmacy Med Name: MELOXICAM 15 MG TABLET] 30 tablet 0     Sig: Take 1 tablet (15 mg total) by mouth daily.       Non-Narcotic Pain Medication Protocol Passed - 10/22/2024  4:46 PM        Passed - In person appointment or virtual visit in the past 6 mos or appointment in next 3 mos     Recent Outpatient Visits              4 months ago Physical exam, annual    [facility], [place], Ninel, MD    Office Visit    4 months ago Age-related nuclear cataract of both eyes    [facility], Munir Berg MD    Office Visit    9 months ago Primary open angle glaucoma (POAG) of left eye, moderate stage    [facility]    [facility]    1 year ago Primary open angle glaucoma (POAG) of left eye, moderate stage    [facility]    [facility]    1 year ago Primary open angle glaucoma (POAG) of left eye, moderate stage    [facility]    [facility]                         Recent Outpatient Visits              4 months ago Physical exam, annual    [facility], [place], Ninel, MD    Office Visit    4 months ago Age-related nuclear cataract of both eyes    [facility], Munir Berg MD    Office Visit    9 months ago Primary open angle glaucoma (POAG) of left eye, moderate stage    [facility]    [facility]    1 year ago Primary open angle glaucoma (POAG) of left eye, moderate stage    [facility]    [facility]    1 year ago Primary  open angle glaucoma (POAG) of left eye, moderate stage    [facility] [place] [facility] [place] Munir Rose MD    Office Visit

## 2024-11-14 RX ORDER — FLUTICASONE PROPIONATE 50 MCG
2 SPRAY, SUSPENSION (ML) NASAL DAILY
Qty: 48 ML | Refills: 3 | Status: SHIPPED | OUTPATIENT
Start: 2024-11-14

## 2024-11-14 NOTE — TELEPHONE ENCOUNTER
Refill passed per [facility] protocol.  Requested Prescriptions   Pending Prescriptions Disp Refills    FLUTICASONE PROPIONATE 50 MCG/ACT Nasal Suspension [Pharmacy Med Name: FLUTICASONE PROP 50 MCG SPRAY] 48 mL 1     Sig: SPRAY 2 SPRAYS INTO EACH NOSTRIL EVERY DAY       Allergy Medication Protocol Passed - 11/14/2024  9:13 AM        Passed - In person appointment or virtual visit in the past 12 mos or appointment in next 3 mos     Recent Outpatient Visits              4 months ago Physical exam, annual    [facility] [place] Kandel, Ninel, MD    Office Visit    5 months ago [facility]    10 months ago Primary open angle glaucoma (POAG) of left eye, moderate stage    [facility]    [facility]    1 year ago Primary open angle glaucoma (POAG) of left eye, moderate stage    [facility]    [facility]    1 year ago Primary open angle glaucoma (POAG) of left eye, moderate stage    [facility]    [facility]                           Recent Outpatient Visits              4 months ago Physical exam, annual    [facility], [place] Kandel, Ninel, MD    Office Visit    5 months ago Age-related nuclear cataract of both eyes    [facility], Munir Berg MD    Office Visit    10 months ago Primary open angle glaucoma (POAG) of left eye, moderate stage    [facility]    [facility]    1 year ago Primary open angle glaucoma (POAG) of left eye, moderate stage    [facility]    [facility]     1 year ago Primary open angle glaucoma (POAG) of left eye, moderate stage    [facility], [facility] Munir Rose MD    Office Visit

## 2025-04-08 ENCOUNTER — NURSE TRIAGE (OUTPATIENT)
Dept: INTERNAL MEDICINE CLINIC | Facility: CLINIC | Age: 65
End: 2025-04-08

## 2025-04-08 NOTE — TELEPHONE ENCOUNTER
Action Requested: Summary for Provider     []  Critical Lab, Recommendations Needed  [] Need Additional Advice  []   FYI    []   Need Orders  [] Need Medications Sent to Pharmacy  []  Other     SUMMARY: states she has been feeling more lethargic and tired, also having a difficulty time urinating at times. Not having any urinary symptoms at this time. Scheduled an appointment for one week.     Reason for call: Fatigue  Onset: >1 week    The patients daughter called, states She has been complaining of feeling lethargic and having no energy, she is also complaining of not being able to urinate.  States she will drink cranberry juice and then she will be able to urinate. She states when she tries to urinate only a few drops come out and she feels like her bladder is full still - he is not having the issue today. No abdominal pain, no flank pain, no fevers, no pain or burning with urination.      The patients daughter states she has been having a hard time with memory and she believes she is forgetting to eat and drink normally.     The patient is not having any urinary symptoms today, scheduled an appointment for 04/14. Advised her to call us if anything changes or she develops any abdominal pain or back pain or pain or burning with urination.     Future Appointments   Date Time Provider Department Center   4/14/2025  9:20 AM Sas, Kathryn E., APRN [id number] [place]       Reason for Disposition   All other urine symptoms    Protocols used: Urinary Symptoms-A-OH

## 2025-04-14 ENCOUNTER — EKG ENCOUNTER (OUTPATIENT)
Dept: LAB | Age: 65
End: 2025-04-14
Attending: NURSE PRACTITIONER
Payer: COMMERCIAL

## 2025-04-14 ENCOUNTER — OFFICE VISIT (OUTPATIENT)
Dept: INTERNAL MEDICINE CLINIC | Facility: CLINIC | Age: 65
End: 2025-04-14

## 2025-04-14 ENCOUNTER — LAB ENCOUNTER (OUTPATIENT)
Dept: LAB | Age: 65
End: 2025-04-14
Attending: NURSE PRACTITIONER
Payer: COMMERCIAL

## 2025-04-14 ENCOUNTER — HOSPITAL ENCOUNTER (OUTPATIENT)
Dept: GENERAL RADIOLOGY | Age: 65
Discharge: HOME | End: 2025-04-14
Attending: NURSE PRACTITIONER
Payer: COMMERCIAL

## 2025-04-14 VITALS
BODY MASS INDEX: 31.02 KG/M2 | WEIGHT: 193 LBS | SYSTOLIC BLOOD PRESSURE: 127 MMHG | HEART RATE: 84 BPM | DIASTOLIC BLOOD PRESSURE: 81 MMHG | HEIGHT: 66 IN

## 2025-04-14 DIAGNOSIS — R53.83 OTHER FATIGUE: Primary | ICD-10-CM

## 2025-04-14 DIAGNOSIS — R70.0 ELEVATED SED RATE: Primary | ICD-10-CM

## 2025-04-14 DIAGNOSIS — R53.83 OTHER FATIGUE: ICD-10-CM

## 2025-04-14 DIAGNOSIS — R70.0 ELEVATED SED RATE: ICD-10-CM

## 2025-04-14 LAB
ALBUMIN SERPL-MCNC: 4.5 G/DL (ref 3.2–4.8)
ALBUMIN/GLOB SERPL: 1.5 {RATIO} (ref 1–2)
ALP LIVER SERPL-CCNC: 130 U/L (ref 50–130)
ALT SERPL-CCNC: 15 U/L (ref 10–49)
ANION GAP SERPL CALC-SCNC: 8 MMOL/L (ref 0–18)
AST SERPL-CCNC: 25 U/L (ref ?–34)
ATRIAL RATE: 81 BPM
BASOPHILS # BLD AUTO: 0.02 X10(3) UL (ref 0–0.2)
BASOPHILS NFR BLD AUTO: 0.3 %
BILIRUB SERPL-MCNC: 0.6 MG/DL (ref 0.2–1.1)
BILIRUB UR QL: NEGATIVE
BUN BLD-MCNC: 8 MG/DL (ref 9–23)
BUN/CREAT SERPL: 9.3 (ref 10–20)
CALCIUM BLD-MCNC: 9.3 MG/DL (ref 8.7–10.4)
CHLORIDE SERPL-SCNC: 107 MMOL/L (ref 98–112)
CLARITY UR: CLEAR
CO2 SERPL-SCNC: 28 MMOL/L (ref 21–32)
CREAT BLD-MCNC: 0.86 MG/DL (ref 0.55–1.02)
CRP SERPL-MCNC: <0.4 MG/DL (ref ?–1)
DEPRECATED HBV CORE AB SER IA-ACNC: 157 NG/ML (ref 50–306)
DEPRECATED RDW RBC AUTO: 45.4 FL (ref 35.1–46.3)
EGFRCR SERPLBLD CKD-EPI 2021: 75 ML/MIN/1.73M2 (ref 60–?)
EOSINOPHIL # BLD AUTO: 0.07 X10(3) UL (ref 0–0.7)
EOSINOPHIL NFR BLD AUTO: 1.2 %
ERYTHROCYTE [DISTWIDTH] IN BLOOD BY AUTOMATED COUNT: 14 % (ref 11–15)
ERYTHROCYTE [SEDIMENTATION RATE] IN BLOOD: 72 MM/HR (ref 0–30)
EST. AVERAGE GLUCOSE BLD GHB EST-MCNC: 108 MG/DL (ref 68–126)
FASTING STATUS PATIENT QL REPORTED: YES
GLOBULIN PLAS-MCNC: 3 G/DL (ref 2–3.5)
GLUCOSE BLD-MCNC: 102 MG/DL (ref 70–99)
GLUCOSE UR-MCNC: NORMAL MG/DL
HBA1C MFR BLD: 5.4 % (ref ?–5.7)
HCT VFR BLD AUTO: 40.7 % (ref 35–48)
HGB BLD-MCNC: 13.1 G/DL (ref 12–16)
IMM GRANULOCYTES # BLD AUTO: 0.02 X10(3) UL (ref 0–1)
IMM GRANULOCYTES NFR BLD: 0.3 %
IRON SATN MFR SERPL: 25 % (ref 15–50)
IRON SERPL-MCNC: 74 UG/DL (ref 50–170)
KETONES UR-MCNC: NEGATIVE MG/DL
LEUKOCYTE ESTERASE UR QL STRIP.AUTO: 25
LYMPHOCYTES # BLD AUTO: 2.06 X10(3) UL (ref 1–4)
LYMPHOCYTES NFR BLD AUTO: 35.6 %
MCH RBC QN AUTO: 28.4 PG (ref 26–34)
MCHC RBC AUTO-ENTMCNC: 32.2 G/DL (ref 31–37)
MCV RBC AUTO: 88.3 FL (ref 80–100)
MONOCYTES # BLD AUTO: 0.46 X10(3) UL (ref 0.1–1)
MONOCYTES NFR BLD AUTO: 8 %
NEUTROPHILS # BLD AUTO: 3.15 X10 (3) UL (ref 1.5–7.7)
NEUTROPHILS # BLD AUTO: 3.15 X10(3) UL (ref 1.5–7.7)
NEUTROPHILS NFR BLD AUTO: 54.6 %
NITRITE UR QL STRIP.AUTO: NEGATIVE
OSMOLALITY SERPL CALC.SUM OF ELEC: 295 MOSM/KG (ref 275–295)
P AXIS: 53 DEGREES
P-R INTERVAL: 152 MS
PH UR: 6.5 [PH] (ref 5–8)
PLATELET # BLD AUTO: 218 10(3)UL (ref 150–450)
POTASSIUM SERPL-SCNC: 3.9 MMOL/L (ref 3.5–5.1)
PROT SERPL-MCNC: 7.5 G/DL (ref 5.7–8.2)
PROT UR-MCNC: NEGATIVE MG/DL
Q-T INTERVAL: 370 MS
QRS DURATION: 98 MS
QTC CALCULATION (BEZET): 429 MS
R AXIS: 22 DEGREES
RBC # BLD AUTO: 4.61 X10(6)UL (ref 3.8–5.3)
SODIUM SERPL-SCNC: 143 MMOL/L (ref 136–145)
SP GR UR STRIP: 1.01 (ref 1–1.03)
T AXIS: 12 DEGREES
TOTAL IRON BINDING CAPACITY: 294 UG/DL (ref 250–425)
TRANSFERRIN SERPL-MCNC: 228 MG/DL (ref 250–380)
TSI SER-ACNC: 1.2 UIU/ML (ref 0.55–4.78)
UROBILINOGEN UR STRIP-ACNC: NORMAL
VENTRICULAR RATE: 81 BPM
WBC # BLD AUTO: 5.8 X10(3) UL (ref 4–11)

## 2025-04-14 PROCEDURE — 83036 HEMOGLOBIN GLYCOSYLATED A1C: CPT | Performed by: NURSE PRACTITIONER

## 2025-04-14 PROCEDURE — 80053 COMPREHEN METABOLIC PANEL: CPT | Performed by: NURSE PRACTITIONER

## 2025-04-14 PROCEDURE — 81001 URINALYSIS AUTO W/SCOPE: CPT | Performed by: NURSE PRACTITIONER

## 2025-04-14 PROCEDURE — 84466 ASSAY OF TRANSFERRIN: CPT | Performed by: NURSE PRACTITIONER

## 2025-04-14 PROCEDURE — 86140 C-REACTIVE PROTEIN: CPT | Performed by: NURSE PRACTITIONER

## 2025-04-14 PROCEDURE — 93005 ELECTROCARDIOGRAM TRACING: CPT

## 2025-04-14 PROCEDURE — 85025 COMPLETE CBC W/AUTO DIFF WBC: CPT | Performed by: NURSE PRACTITIONER

## 2025-04-14 PROCEDURE — 71046 X-RAY EXAM CHEST 2 VIEWS: CPT | Performed by: NURSE PRACTITIONER

## 2025-04-14 PROCEDURE — 82728 ASSAY OF FERRITIN: CPT | Performed by: NURSE PRACTITIONER

## 2025-04-14 PROCEDURE — 85652 RBC SED RATE AUTOMATED: CPT | Performed by: NURSE PRACTITIONER

## 2025-04-14 PROCEDURE — 87086 URINE CULTURE/COLONY COUNT: CPT | Performed by: NURSE PRACTITIONER

## 2025-04-14 PROCEDURE — 83540 ASSAY OF IRON: CPT | Performed by: NURSE PRACTITIONER

## 2025-04-14 PROCEDURE — 36415 COLL VENOUS BLD VENIPUNCTURE: CPT | Performed by: NURSE PRACTITIONER

## 2025-04-14 PROCEDURE — 93010 ELECTROCARDIOGRAM REPORT: CPT | Performed by: INTERNAL MEDICINE

## 2025-04-14 PROCEDURE — 84443 ASSAY THYROID STIM HORMONE: CPT | Performed by: NURSE PRACTITIONER

## 2025-04-14 NOTE — PROGRESS NOTES
Mely Kendrick is a 64 year old female.  HPI:   Pt reports over the past 3 months feeling fatigued.  Symptoms present for 3 months.  Denies any chest pain, shortness of breath, dizziness, palpitations, appetite or weight changes, nausea vomiting diarrhea constipation, abdominal pain, rashes, cough, fever, chills, body aches, poor sleep, snoring.  She reports feeling low energy.  Current Medications[1]   Past Medical History[2]   Social History:  Short Social Hx on File[3]     REVIEW OF SYSTEMS:   Review of Systems   Constitutional:  Positive for fatigue. Negative for activity change, appetite change, chills, diaphoresis, fever and unexpected weight change.   HENT:  Negative for congestion, dental problem and sore throat.    Eyes:  Negative for photophobia, pain, discharge, redness, itching and visual disturbance.   Respiratory:  Negative for cough, chest tightness, shortness of breath and wheezing.    Cardiovascular:  Negative for chest pain, palpitations and leg swelling.   Gastrointestinal:  Negative for abdominal pain, constipation, diarrhea, nausea and vomiting.   Endocrine: Negative.    Genitourinary:  Negative for difficulty urinating, frequency and menstrual problem.   Musculoskeletal:  Negative for arthralgias and back pain.   Skin:  Negative for color change, pallor, rash and wound.   Neurological:  Negative for dizziness, tremors, syncope, weakness, numbness and headaches.   Psychiatric/Behavioral:  Negative for behavioral problems, dysphoric mood and suicidal ideas. The patient is not nervous/anxious.           EXAM:   /81 (BP Location: Right arm, Patient Position: Sitting, Cuff Size: large)   Pulse 84   Ht 5' 6\" (1.676 m)   Wt 193 lb (87.5 kg)   BMI 31.15 kg/m²     Physical Exam  Vitals reviewed.   Constitutional:       General: She is not in acute distress.     Appearance: Normal appearance. She is obese. She is not ill-appearing.   HENT:      Head: Normocephalic and atraumatic.       Mouth/Throat:      Pharynx: Oropharynx is clear.   Eyes:      General: No scleral icterus.        Right eye: No discharge.         Left eye: No discharge.      Extraocular Movements: Extraocular movements intact.      Conjunctiva/sclera: Conjunctivae normal.      Pupils: Pupils are equal, round, and reactive to light.   Neck:      Thyroid: No thyroid mass or thyromegaly.      Vascular: No carotid bruit.   Cardiovascular:      Rate and Rhythm: Normal rate and regular rhythm.      Pulses: Normal pulses.      Heart sounds: Normal heart sounds. No murmur heard.  Pulmonary:      Effort: Pulmonary effort is normal. No respiratory distress.      Breath sounds: Normal breath sounds. No stridor. No wheezing, rhonchi or rales.   Chest:      Chest wall: No tenderness.   Abdominal:      General: Abdomen is flat. Bowel sounds are normal. There is no distension.      Palpations: Abdomen is soft. There is no mass.      Tenderness: There is no abdominal tenderness.   Musculoskeletal:         General: Normal range of motion.      Cervical back: Normal range of motion and neck supple. No rigidity or tenderness.      Right lower leg: No edema.      Left lower leg: No edema.   Lymphadenopathy:      Cervical: No cervical adenopathy.   Skin:     General: Skin is warm and dry.      Coloration: Skin is not jaundiced or pale.   Neurological:      General: No focal deficit present.      Mental Status: She is alert and oriented to person, place, and time.      Cranial Nerves: Cranial nerves 2-12 are intact. No cranial nerve deficit.      Sensory: Sensation is intact. No sensory deficit.      Motor: Motor function is intact. No weakness.      Coordination: Coordination is intact. Romberg sign negative. Coordination normal. Finger-Nose-Finger Test normal.      Gait: Gait is intact. Gait normal.   Psychiatric:         Mood and Affect: Mood normal.         Judgment: Judgment normal.            ASSESSMENT AND PLAN:     Assessment & Plan  Other  fatigue  Unclear etiology, will proceed with workup.  Advised to schedule her annual physical, mammogram, Pap smear, colonoscopy.  Patient verbalized understanding.  Reviewed concerning signs and symptoms  Orders:    XR CHEST PA + LAT CHEST (CPT=71046); Future    EKG 12 Lead to be performed at [facility]; Future    TSH W Reflex To Free T4 [E]    CBC W Differential W Platelet [E]    Comp Metabolic Panel (14) [E]    Sed Rate, Westergren (Automated) [E]    C-Reactive Protein [E]    Urinalysis with Culture Reflex    Hemoglobin A1C [E]    Ferritin [E]    Iron And Tibc [E]    Urine Culture, Routine         The patient indicates understanding of these issues and agrees to the plan.  The patient is asked to return in 2-4 weeks.     The above note was creating using Dragon speech recognition technology. Please excuse any typos.         [1]   Current Outpatient Medications   Medication Sig Dispense Refill    fluticasone propionate 50 MCG/ACT Nasal Suspension 2 sprays by Nasal route daily. (Patient not taking: Reported on 4/14/2025) 48 mL 3    Meloxicam 15 MG Oral Tab Take 1 tablet (15 mg total) by mouth daily. (Patient not taking: Reported on 4/14/2025) 90 tablet 0    latanoprost 0.005 % Ophthalmic Solution Place 1 drop into the left eye daily. Instill 1 drop by ophthalmic route every morning into left eye 90 day supply (Patient not taking: Reported on 4/14/2025) 3 each 3    mometasone 0.1 % External Ointment Apply to the area on the face daily not more than 10 days (Patient not taking: Reported on 4/14/2025) 15 g 0    Loratadine-Pseudoephedrine ER (CLARITIN-D 24 HOUR)  MG Oral Tablet 24 Hr Take 1 tablet by mouth daily. (Patient not taking: Reported on 4/14/2025) 30 tablet 5    IBUPROFEN 600 MG Oral Tab TAKE 1 TABLET(600 MG) BY MOUTH EVERY 8 HOURS AS NEEDED FOR PAIN (Patient not taking: Reported on 4/14/2025) 90 tablet 0    Cyclobenzaprine HCl 10 MG Oral Tab Take 1 tablet (10 mg total) by mouth nightly.  (Patient not taking: Reported on 4/14/2025) 90 tablet 0   [2]   Past Medical History:   Allergic rhinitis    Anxiety    Arthritis    Back pain    arthritis    Ectopic pregnancy (HCC)    Glaucoma, left eye    DX of glaucoma in the left eye by INDIRA based on VF and OCT results- START Latanoprost qhs OS    Lipid screening    per VARSHA Caceres   [3]   Social History  Socioeconomic History    Marital status:    Tobacco Use    Smoking status: Never    Smokeless tobacco: Never   Vaping Use    Vaping status: Never Used   Substance and Sexual Activity    Alcohol use: No     Alcohol/week: 0.0 standard drinks of alcohol    Drug use: No    Sexual activity: Not Currently   Other Topics Concern    Caffeine Concern No    Exercise No    Pt has a pacemaker No    Pt has a defibrillator No    Reaction to local anesthetic No   Social History Narrative    The patient does not use an assistive device..      The patient does live in a home with stairs.

## (undated) NOTE — MR AVS SNAPSHOT
[facility] [employer] [facility]  [address], [address] [place] [address]  [address] [facility] [phone]               Thank you for choosing us for your health care visit with Tennis .  Fernando Urbina MD.  We are glad to serve you and happy to provide you with this summary of you ? Please allow the office 48-72 hours to fill the prescription. ? Patient must present photo ID at time of .   If a designated family member will be picking up prescription, office must be given name of individual in advance and they must present a She will call me once she has had the imaging studies and I will review them and my office will get back in touch with the patient with any changes in the plan within 7-10 days. She will follow up in 2-3 months or sooner if needed.            Follow Up [Y20.04, G89.29], Lumbar radiculopathy [M54.16], Left hip pain [M25.552], Lumbar disc disease [M51.9], Spondylolisthesis of lumbar region [M43.16]           XR LUMBAR SPINE AP LAT FLEX EXT EM (CPT=72120)    Complete by:  Jan 26, 2017 (Approximate)    Assoc to obtain prior authorization. Unauthorized care may be your financial responsibility. If you have questions, please call your plan's customer service number located on your ID card.     [facility] Locations:    [facility]

## (undated) NOTE — LETTER
November 7, 2017    Arnaldo James MD  [address] [address]     Patient: Ansley Fitzgerald   YOB: 1960   Date of Visit: 11/7/2017       Dear Dr. Nevaeh Robin MD:    Thank you for referring Terry Valero to me for evaluation.  Here is NEEDED FOR PAIN Disp: 90 tablet Rfl: 0   ERGOCALCIFEROL 77887 units Oral Cap TAKE 1 CAPSULE BY MOUTH 1 TIME A WEEK Disp: 12 capsule Rfl: 0   ValACYclovir HCl 500 MG Oral Tab Take 1 tablet (500 mg total) by mouth daily.  Disp: 90 tablet Rfl: 1       Allergie If you have questions, please do not hesitate to call me. I look forward to following Dain Soriano along with you.     Sincerely,        Flako Garrison MD        CC: No Recipients    Document electronically generated by: Flako Garrison

## (undated) NOTE — LETTER
October 18, 2018    Rivas Chaudhry MD  [address] [address]     Patient: Arlene Cyr   YOB: 1960   Date of Visit: 10/18/2018       Dear Dr. Dakhsa Winston MD:    Thank you for referring Gee Sims to me for evaluation.  Here is Loratadine-Pseudoephedrine (CLARITIN-D 24 HOUR OR) Take 1 tablet by mouth daily. Disp:  Rfl:    Cyclobenzaprine HCl 10 MG Oral Tab Take 1 tablet (10 mg total) by mouth nightly.  Disp: 90 tablet Rfl: 0   VALACYCLOVIR  MG Oral Tab TAKE 1 TABLET(500 MG) Periphery Normal Normal            Refraction     Wearing Rx     Type:  forgot glasses          Cycloplegic Refraction (Auto)       Sphere Cylinder Axis    Right -0.75 +0.75 170    Left -1.00 +1.25 170                 ASSESSMENT/PLAN:     Diagnoses and Pl

## (undated) NOTE — LETTER
10/24/2018            [address] [address]       Dear Karthik Nicole,    The visual field test you took on 10/23/2018 indicated normal field of vision in the right eye and inferior arcuate scotoma (loss of vision in

## (undated) NOTE — MR AVS SNAPSHOT
[facility]  [address] [place] [address], [address] [facility]               Thank you for choosing us for your health care visit with Edith Steele MD.  We are glad to serve you and happy to provide you with this summary of yo Place 2 drops into both eyes every 6 (six) hours. Commonly known as:  TOBREX           ValACYclovir HCl 500 MG Tabs   Take 1 tablet (500 mg total) by mouth daily.    Commonly known as:  VALTREX                Where to Get Your Medications      You can get

## (undated) NOTE — Clinical Note
[address] [facility], [address] [place] [address]  Marlon Yuen [phone]            January 26, 2017    The purpose of this Agreement is to prevent misunderstandings about certain medications you will be character and intensity of my pain, the effect of the pain on my daily life, and how well medicine is helping to relieve pain.    _______ I will not use any illegal controlled substances, including marijuana, cocaine, etc., nor will I misuse or self-prescr Date                 Time                 Signature of Witness

## (undated) NOTE — LETTER
12/26/2017              [address] [place] [address] [address]         Dear [place],    It was a pleasure to see you. Your PAP test was normal.  There is no need for further testing at this time.   I look forward to seeing you at

## (undated) NOTE — LETTER
06/08/19    [address] [facility]      Dear Matt Light records indicate that you have outstanding lab work and or testing that was ordered for you and has not yet been completed:  Orders Placed This [address]

## (undated) NOTE — LETTER
2/9/2022              [address] [facility] [address]         Dear Anup Morrison,    It has come to my attention that you cancelled your last appointment with me. As you know, regular medical attention is essential to maintaining your ocular health. Please contact the office at your earliest convenience to reschedule. I look forward to seeing you soon.   You are due for a visual field, OCT and a dilated eye exam.        Sincerely,    Johnna Lopez MD  [address] [place] [id number]  [phone]        Document electronically generated by:  Barbra Mercer